# Patient Record
Sex: FEMALE | Race: BLACK OR AFRICAN AMERICAN | NOT HISPANIC OR LATINO | Employment: STUDENT | URBAN - METROPOLITAN AREA
[De-identification: names, ages, dates, MRNs, and addresses within clinical notes are randomized per-mention and may not be internally consistent; named-entity substitution may affect disease eponyms.]

---

## 2017-05-17 ENCOUNTER — HOSPITAL ENCOUNTER (EMERGENCY)
Facility: HOSPITAL | Age: 27
Discharge: HOME | End: 2017-05-17
Attending: EMERGENCY MEDICINE | Admitting: EMERGENCY MEDICINE

## 2017-05-17 VITALS
SYSTOLIC BLOOD PRESSURE: 118 MMHG | DIASTOLIC BLOOD PRESSURE: 63 MMHG | TEMPERATURE: 99.2 F | OXYGEN SATURATION: 97 % | RESPIRATION RATE: 18 BRPM | HEART RATE: 72 BPM | BODY MASS INDEX: 31.86 KG/M2 | HEIGHT: 67 IN | WEIGHT: 203 LBS

## 2017-05-17 DIAGNOSIS — S91.311A LACERATION OF RIGHT HEEL, INITIAL ENCOUNTER: Primary | ICD-10-CM

## 2017-05-17 PROCEDURE — 25010000002 TDAP 5-2.5-18.5 LF-MCG/0.5 SUSPENSION: Performed by: EMERGENCY MEDICINE

## 2017-05-17 PROCEDURE — 90715 TDAP VACCINE 7 YRS/> IM: CPT | Performed by: EMERGENCY MEDICINE

## 2017-05-17 PROCEDURE — 99283 EMERGENCY DEPT VISIT LOW MDM: CPT

## 2017-05-17 PROCEDURE — 90471 IMMUNIZATION ADMIN: CPT | Performed by: EMERGENCY MEDICINE

## 2017-05-17 RX ORDER — BACITRACIN, NEOMYCIN, POLYMYXIN B 400; 3.5; 5 [USP'U]/G; MG/G; [USP'U]/G
OINTMENT TOPICAL
Status: DISCONTINUED
Start: 2017-05-17 | End: 2017-05-17 | Stop reason: WASHOUT

## 2017-05-17 RX ORDER — BACITRACIN ZINC AND POLYMYXIN B SULFATE 500; 10000 [USP'U]/G; [USP'U]/G
OINTMENT TOPICAL ONCE
Status: COMPLETED | OUTPATIENT
Start: 2017-05-17 | End: 2017-05-17

## 2017-05-17 RX ADMIN — BACITRACIN ZINC AND POLYMYXIN B SULFATE 1 APPLICATION: 500; 10000 OINTMENT TOPICAL at 19:04

## 2017-05-17 RX ADMIN — TETANUS TOXOID, REDUCED DIPHTHERIA TOXOID AND ACELLULAR PERTUSSIS VACCINE, ADSORBED 0.5 ML: 5; 2.5; 8; 8; 2.5 SUSPENSION INTRAMUSCULAR at 19:15

## 2017-06-09 ENCOUNTER — OFFICE VISIT (OUTPATIENT)
Dept: INTERNAL MEDICINE | Facility: CLINIC | Age: 27
End: 2017-06-09

## 2017-06-09 VITALS
HEIGHT: 67 IN | SYSTOLIC BLOOD PRESSURE: 110 MMHG | BODY MASS INDEX: 32.99 KG/M2 | TEMPERATURE: 97.8 F | WEIGHT: 210.2 LBS | DIASTOLIC BLOOD PRESSURE: 66 MMHG

## 2017-06-09 DIAGNOSIS — S91.301D OPEN WOUND OF RIGHT HEEL, SUBSEQUENT ENCOUNTER: Primary | ICD-10-CM

## 2017-06-09 PROCEDURE — 99203 OFFICE O/P NEW LOW 30 MIN: CPT | Performed by: NURSE PRACTITIONER

## 2017-06-09 RX ORDER — ETONOGESTREL AND ETHINYL ESTRADIOL 11.7; 2.7 MG/1; MG/1
1 INSERT, EXTENDED RELEASE VAGINAL
COMMUNITY
End: 2018-10-08

## 2017-06-09 NOTE — PROGRESS NOTES
Subjective   Vahid Overton is a 27 y.o. female    Chief Complaint   Patient presents with   • Establish Care   • ER follow up on Laceration from 5/17/17   • Laceration     Right heel.      History of Present Illness     New pt here to establish care.      On the evening of 5/17/17, Vahid and a friend were cooking out.  Her friend dropped the metal spatula he was using and it landed on her right heel.  She initially bandaged it and thought it was fine.  It was still bleeding the next AM, so she went to the  and they sent her to the ER in case there was damage to her achilles.  She was evaluated and Tdap was updated.  Her achilles did not appear to be involved and she was d/c'd home being as though it was too late for sutures.  It seemed to be healing well, but she flexed her foot about 1 week later and the wound popped back open.  She has been keeping it wrapped and wearing a splint off and on since then.  Wound seems to be healing well and she has full ROM of this heel/foot.  No pain, redness or swelling.  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      Past Medical History:   Diagnosis Date   • History of Papanicolaou smear of cervix 02/2017    Student clinic at [facility]     History reviewed. No pertinent surgical history.    Allergies   Allergen Reactions   • Sulfa Antibiotics Rash     Family History   Problem Relation Age of Onset   • Breast cancer Mother 36 1994   • Diabetes Father    • Hypertension Father    • Breast cancer Maternal Aunt    • Diabetes Paternal Uncle    • Breast cancer Maternal Grandmother    • Diabetes Paternal Grandmother    • Kidney disease Paternal Grandmother    • No Known Problems Paternal Grandfather    • Diabetes Paternal Uncle    • Diabetes Paternal Uncle    • No Known Problems Sister    • No Known Problems Brother    • No Known Problems Sister    • No Known Problems Brother    • No Known Problems Brother    • No Known Problems Brother       Social History     Social History   • Marital status: Single     Spouse name: N/A   • Number of children: N/A   • Years of education: N/A     Occupational History   • Not on file.     Social History Main Topics   • Smoking status: Never Smoker   • Smokeless tobacco: Never Used   • Alcohol use Yes      Comment: Socially on occasion   • Drug use: Yes     Special: Marijuana   • Sexual activity: Yes     Partners: Male     Birth control/ protection: Implant     Other Topics Concern   • Not on file     Social History Narrative   • No narrative on file         The following portions of the patient's history were reviewed and updated as appropriate: allergies, current medications, past family history, past medical history, past social history, past surgical history and problem list.    Current Outpatient Prescriptions:   •  Etonogestrel (NEXPLANON) 68 MG implant subdermal implant, Inject 1 each into the skin 1 (One) Time., Disp: , Rfl:   •  etonogestrel-ethinyl estradiol (NUVARING) 0.12-0.015 MG/24HR vaginal ring, Insert 1 each into the vagina Every 28 (Twenty-Eight) Days. Insert vaginally and leave in place for 3 consecutive weeks, then remove for 1 week., Disp: , Rfl:      Review of Systems   Constitutional: Negative for chills, fatigue and fever.   Respiratory: Negative for cough, chest tightness and shortness of breath.    Cardiovascular: Negative for chest pain.   Gastrointestinal: Negative for abdominal pain, diarrhea, nausea and vomiting.   Endocrine: Negative for cold intolerance and heat intolerance.   Musculoskeletal: Negative for arthralgias.   Skin: Positive for wound (right heel).   Neurological: Negative for dizziness.       Objective   Physical Exam   Constitutional: She is oriented to person, place, and time. She appears well-developed and well-nourished.   HENT:   Head: Normocephalic and atraumatic.   Eyes: Conjunctivae and EOM are normal. Pupils are equal, round, and reactive to light.   Neck: Normal  "range of motion.   Cardiovascular: Normal rate, regular rhythm and normal heart sounds.    Pulmonary/Chest: Effort normal and breath sounds normal.   Abdominal: Soft. Bowel sounds are normal.   Musculoskeletal: Normal range of motion.        Right ankle: Achilles tendon normal. Achilles tendon exhibits no pain, no defect and normal Pat's test results.   Neurological: She is alert and oriented to person, place, and time. She has normal reflexes.   Skin: Skin is warm and dry. Laceration (right heel; approximatly 2cm; scabbed and healing well without s/s of infection) noted.   Psychiatric: She has a normal mood and affect. Her behavior is normal. Judgment and thought content normal.     Vitals:    06/09/17 1039   BP: 110/66   Temp: 97.8 °F (36.6 °C)   TempSrc: Temporal Artery    Weight: 210 lb 3.2 oz (95.3 kg)   Height: 67\" (170.2 cm)         Assessment/Plan   Vahid was seen today for establish care, er follow up on laceration from 5/17/17 and laceration.    Diagnoses and all orders for this visit:    Open wound of right heel, subsequent encounter    Heel looks great!  Achilles exam normal  May return to work without restriction  RTC soon for PE         "

## 2017-06-19 ENCOUNTER — TELEPHONE (OUTPATIENT)
Dept: INTERNAL MEDICINE | Facility: CLINIC | Age: 27
End: 2017-06-19

## 2017-06-19 NOTE — TELEPHONE ENCOUNTER
Patient called to talk to you about the foot injury she saw Renetta for last week. She is concerned about her achilles tendon. Thanks!

## 2017-06-19 NOTE — TELEPHONE ENCOUNTER
Renetta Sent you an order to have an MRI done, but patient would like to have this done somewhere at [facility] in [place].

## 2017-06-19 NOTE — TELEPHONE ENCOUNTER
Spoke with Vahid and she said that it is still very hard to stand on her tip toes or use that foot to push off. She said that it swollen around that area, but the wound has healed. I let her know I spoke with you and you were okay with a referral to ortho or setting her up for an MRI.     She would like to go ahead and proceed with the MRI asap. She wants to make sure there is no tear of the Achilles before [place] leaves out of the [place].

## 2017-06-20 NOTE — TELEPHONE ENCOUNTER
I sent Mary a msg to make sure she can get the MRI scheduled in [place] in possible, But i think you still have to put the order in for the MRI

## 2017-06-22 DIAGNOSIS — M76.61 TENDONITIS, ACHILLES, RIGHT: Primary | ICD-10-CM

## 2017-08-01 ENCOUNTER — OFFICE VISIT (OUTPATIENT)
Dept: INTERNAL MEDICINE | Facility: CLINIC | Age: 27
End: 2017-08-01

## 2017-08-01 VITALS
WEIGHT: 207 LBS | HEART RATE: 67 BPM | OXYGEN SATURATION: 99 % | TEMPERATURE: 98.1 F | SYSTOLIC BLOOD PRESSURE: 116 MMHG | HEIGHT: 67 IN | BODY MASS INDEX: 32.49 KG/M2 | DIASTOLIC BLOOD PRESSURE: 60 MMHG

## 2017-08-01 DIAGNOSIS — Z23 NEED FOR HEPATITIS A IMMUNIZATION: ICD-10-CM

## 2017-08-01 DIAGNOSIS — Z00.00 ANNUAL PHYSICAL EXAM: Primary | ICD-10-CM

## 2017-08-01 LAB
25(OH)D3 SERPL-MCNC: 16 NG/ML
ALBUMIN SERPL-MCNC: 4.1 G/DL (ref 3.2–4.8)
ALBUMIN/GLOB SERPL: 1.2 G/DL (ref 1.5–2.5)
ALP SERPL-CCNC: 45 U/L (ref 25–100)
ALT SERPL W P-5'-P-CCNC: 14 U/L (ref 7–40)
ANION GAP SERPL CALCULATED.3IONS-SCNC: 2 MMOL/L (ref 3–11)
AST SERPL-CCNC: 21 U/L (ref 0–33)
BASOPHILS # BLD AUTO: 0.01 10*3/MM3 (ref 0–0.2)
BASOPHILS NFR BLD AUTO: 0.1 % (ref 0–1)
BILIRUB BLD-MCNC: NEGATIVE MG/DL
BILIRUB SERPL-MCNC: 0.3 MG/DL (ref 0.3–1.2)
BUN BLD-MCNC: 15 MG/DL (ref 9–23)
BUN/CREAT SERPL: 16.7 (ref 7–25)
CALCIUM SPEC-SCNC: 9.4 MG/DL (ref 8.7–10.4)
CHLORIDE SERPL-SCNC: 104 MMOL/L (ref 99–109)
CHOLEST SERPL-MCNC: 125 MG/DL (ref 0–200)
CLARITY, POC: CLEAR
CO2 SERPL-SCNC: 30 MMOL/L (ref 20–31)
COLOR UR: YELLOW
CREAT BLD-MCNC: 0.9 MG/DL (ref 0.6–1.3)
DEPRECATED RDW RBC AUTO: 43.5 FL (ref 37–54)
EOSINOPHIL # BLD AUTO: 0.11 10*3/MM3 (ref 0–0.3)
EOSINOPHIL NFR BLD AUTO: 1 % (ref 0–3)
ERYTHROCYTE [DISTWIDTH] IN BLOOD BY AUTOMATED COUNT: 12.3 % (ref 11.3–14.5)
GFR SERPL CREATININE-BSD FRML MDRD: 92 ML/MIN/1.73
GLOBULIN UR ELPH-MCNC: 3.3 GM/DL
GLUCOSE BLD-MCNC: 92 MG/DL (ref 70–100)
GLUCOSE UR STRIP-MCNC: NEGATIVE MG/DL
HCT VFR BLD AUTO: 36.9 % (ref 34.5–44)
HGB BLD-MCNC: 12.3 G/DL (ref 11.5–15.5)
IMM GRANULOCYTES # BLD: 0.02 10*3/MM3 (ref 0–0.03)
IMM GRANULOCYTES NFR BLD: 0.2 % (ref 0–0.6)
KETONES UR QL: NEGATIVE
LEUKOCYTE EST, POC: NEGATIVE
LYMPHOCYTES # BLD AUTO: 2.64 10*3/MM3 (ref 0.6–4.8)
LYMPHOCYTES NFR BLD AUTO: 23.2 % (ref 24–44)
MCH RBC QN AUTO: 32.6 PG (ref 27–31)
MCHC RBC AUTO-ENTMCNC: 33.3 G/DL (ref 32–36)
MCV RBC AUTO: 97.9 FL (ref 80–99)
MONOCYTES # BLD AUTO: 0.69 10*3/MM3 (ref 0–1)
MONOCYTES NFR BLD AUTO: 6.1 % (ref 0–12)
NEUTROPHILS # BLD AUTO: 7.93 10*3/MM3 (ref 1.5–8.3)
NEUTROPHILS NFR BLD AUTO: 69.4 % (ref 41–71)
NITRITE UR-MCNC: NEGATIVE MG/ML
PH UR: 7 [PH] (ref 5–8)
PLATELET # BLD AUTO: 328 10*3/MM3 (ref 150–450)
PMV BLD AUTO: 9.6 FL (ref 6–12)
POTASSIUM BLD-SCNC: 3.9 MMOL/L (ref 3.5–5.5)
PROT SERPL-MCNC: 7.4 G/DL (ref 5.7–8.2)
PROT UR STRIP-MCNC: NEGATIVE MG/DL
RBC # BLD AUTO: 3.77 10*6/MM3 (ref 3.89–5.14)
RBC # UR STRIP: NEGATIVE /UL
SODIUM BLD-SCNC: 136 MMOL/L (ref 132–146)
SP GR UR: 1.01 (ref 1–1.03)
TSH SERPL DL<=0.05 MIU/L-ACNC: 1.71 MIU/ML (ref 0.35–5.35)
UROBILINOGEN UR QL: NORMAL
VIT B12 BLD-MCNC: 391 PG/ML (ref 211–911)
WBC NRBC COR # BLD: 11.4 10*3/MM3 (ref 3.5–10.8)

## 2017-08-01 PROCEDURE — 81003 URINALYSIS AUTO W/O SCOPE: CPT | Performed by: NURSE PRACTITIONER

## 2017-08-01 PROCEDURE — 90471 IMMUNIZATION ADMIN: CPT | Performed by: NURSE PRACTITIONER

## 2017-08-01 PROCEDURE — 82465 ASSAY BLD/SERUM CHOLESTEROL: CPT | Performed by: NURSE PRACTITIONER

## 2017-08-01 PROCEDURE — 90632 HEPA VACCINE ADULT IM: CPT | Performed by: NURSE PRACTITIONER

## 2017-08-01 PROCEDURE — 85025 COMPLETE CBC W/AUTO DIFF WBC: CPT | Performed by: NURSE PRACTITIONER

## 2017-08-01 PROCEDURE — 82607 VITAMIN B-12: CPT | Performed by: NURSE PRACTITIONER

## 2017-08-01 PROCEDURE — 99395 PREV VISIT EST AGE 18-39: CPT | Performed by: NURSE PRACTITIONER

## 2017-08-01 PROCEDURE — 82306 VITAMIN D 25 HYDROXY: CPT | Performed by: NURSE PRACTITIONER

## 2017-08-01 PROCEDURE — 84443 ASSAY THYROID STIM HORMONE: CPT | Performed by: NURSE PRACTITIONER

## 2017-08-01 PROCEDURE — 80053 COMPREHEN METABOLIC PANEL: CPT | Performed by: NURSE PRACTITIONER

## 2017-08-01 NOTE — PROGRESS NOTES
Subjective   Vahid Overton is a 27 y.o. female    Chief Complaint   Patient presents with   • Annual Exam     Physical     History of Present Illness     Here for PE    Pt states that she fell and twisted her right ankle about one week ago.  This is the same ankle that she injured in May 2017, when she dropped a spatula on the achilles and had a laceration.  Went to the ER, no sutures were placed.  She has continued to have weakness and pain in the left achilles since.  I have ordered an MRI, but it has not been done.      Will be traveling to [facility] on 8/4/17 for a 6 day vacation.  Has had the Hep B series, but not the Hep A.      Tdap - 5/17/17  Flu shot - fall 2016  Pap - per GYN at     Diet - eats very healthy    Exercise - moderate; works out most days per week.     Social History   Substance Use Topics   • Smoking status: Never Smoker   • Smokeless tobacco: Never Used   • Alcohol use Yes      Comment: Socially on occasion         The following portions of the patient's history were reviewed and updated as appropriate: allergies, current medications, past family history, past medical history, past social history, past surgical history and problem list.    Current Outpatient Prescriptions:   •  Etonogestrel (NEXPLANON) 68 MG implant subdermal implant, Inject 1 each into the skin 1 (One) Time., Disp: , Rfl:   •  etonogestrel-ethinyl estradiol (NUVARING) 0.12-0.015 MG/24HR vaginal ring, Insert 1 each into the vagina Every 28 (Twenty-Eight) Days. Insert vaginally and leave in place for 3 consecutive weeks, then remove for 1 week., Disp: , Rfl:      Review of Systems   Constitutional: Negative for appetite change, chills, fatigue, fever and unexpected weight change.   HENT: Negative for congestion, ear pain, nosebleeds, rhinorrhea and tinnitus.    Eyes: Negative for pain.   Respiratory: Negative for cough, chest tightness and shortness of breath.    Cardiovascular: Negative for chest pain, palpitations and leg  swelling.   Gastrointestinal: Negative for abdominal distention, abdominal pain, blood in stool, constipation, diarrhea, nausea and vomiting.   Endocrine: Negative for cold intolerance, heat intolerance, polydipsia, polyphagia and polyuria.   Genitourinary: Negative for dysuria, flank pain, frequency, hematuria and urgency.   Musculoskeletal: Negative for arthralgias, back pain, gait problem, joint swelling, myalgias and neck pain.        Right ankle pain   Skin: Negative for color change, pallor, rash and wound.   Allergic/Immunologic: Negative for environmental allergies and food allergies.   Neurological: Negative for dizziness, syncope, weakness, light-headedness, numbness and headaches.   Hematological: Negative for adenopathy. Does not bruise/bleed easily.   Psychiatric/Behavioral: Negative for behavioral problems and suicidal ideas. The patient is not nervous/anxious.        Objective   Physical Exam   Constitutional: She is oriented to person, place, and time. She appears well-developed and well-nourished.   HENT:   Head: Normocephalic and atraumatic.   Right Ear: External ear normal.   Left Ear: External ear normal.   Nose: Nose normal.   Mouth/Throat: Oropharynx is clear and moist.   Eyes: Conjunctivae and EOM are normal. Pupils are equal, round, and reactive to light.   Neck: Normal range of motion. Neck supple.   Cardiovascular: Normal rate, regular rhythm, normal heart sounds and intact distal pulses.    Pulses:       Carotid pulses are 2+ on the right side, and 2+ on the left side.  Pulmonary/Chest: Effort normal and breath sounds normal. Right breast exhibits no inverted nipple, no mass, no nipple discharge, no skin change and no tenderness. Left breast exhibits no inverted nipple, no mass, no nipple discharge, no skin change and no tenderness. Breasts are symmetrical. There is no breast swelling.   Abdominal: Soft. Normal appearance, normal aorta and bowel sounds are normal.   Genitourinary: No  "breast tenderness, discharge or bleeding.   Musculoskeletal: Normal range of motion.        Right ankle: She exhibits normal range of motion, no swelling, no ecchymosis, no deformity, no laceration and normal pulse. No tenderness. No lateral malleolus, no medial malleolus, no AITFL, no CF ligament, no posterior TFL, no head of 5th metatarsal and no proximal fibula tenderness found. Achilles tendon exhibits pain and defect. Achilles tendon exhibits normal Pat's test results.   Lymphadenopathy:        Head (right side): No tonsillar adenopathy present.        Head (left side): No tonsillar adenopathy present.        Right cervical: No superficial cervical and no posterior cervical adenopathy present.       Left cervical: No superficial cervical and no posterior cervical adenopathy present.   Neurological: She is alert and oriented to person, place, and time. She has normal strength and normal reflexes. She displays a negative Romberg sign.   Skin: Skin is warm, dry and intact.   Psychiatric: She has a normal mood and affect. Her behavior is normal. Judgment and thought content normal.   Vitals reviewed.    Vitals:    08/01/17 1318   BP: 116/60   BP Location: Right arm   Pulse: 67   Temp: 98.1 °F (36.7 °C)   TempSrc: Temporal Artery    SpO2: 99%   Weight: 207 lb (93.9 kg)   Height: 66.75\" (169.5 cm)         Assessment/Plan   Vahid was seen today for annual exam.    Diagnoses and all orders for this visit:    Annual physical exam  -     POCT urinalysis dipstick, automated  -     CBC & Differential  -     Cholesterol, Total  -     Comprehensive Metabolic Panel  -     Vitamin D 25 Hydroxy  -     Vitamin B12  -     TSH  -     Hepatitis A Vaccine Adult IM  -     CBC Auto Differential    Need for hepatitis A immunization  -     Hepatitis A Vaccine Adult IM    UA is clear  Labs sent   Hep A updated, she will RTC for 2nd dose in 6 months  Pap UTD per GYN  All other immunizations are UTD  Counseling - diet and " exercise  MRI was approved and scheduled, but the scheduling office was not able to get in touch with her.  It will be PA'd again  Repeat PE in 1 year or sooner with any problems           Answers for HPI/ROS submitted by the patient on 7/30/2017   Incident occurred: more than 1 week ago  Incident location: at the pool  Injury mechanism: a direct blow  Pain location: right ankle  Pain quality: stabbing  Pain - numeric: 3/10  Pain course: improving  muscle weakness: Yes  Foreign body present: no foreign bodies

## 2017-08-08 RX ORDER — CHOLECALCIFEROL (VITAMIN D3) 1250 MCG
50000 CAPSULE ORAL
Qty: 8 CAPSULE | Refills: 0 | Status: SHIPPED | OUTPATIENT
Start: 2017-08-08 | End: 2017-09-27

## 2017-08-09 ENCOUNTER — TELEPHONE (OUTPATIENT)
Dept: INTERNAL MEDICINE | Facility: CLINIC | Age: 27
End: 2017-08-09

## 2017-08-09 NOTE — TELEPHONE ENCOUNTER
----- Message from BARTOLOME Jiang sent at 8/8/2017  3:25 PM EDT -----  Please let pt know that her Vitamin D is very low.  I have sent in an 8 week Rx of D3 that she will take once a week, then she will need to start on OTC D3, 4000 units daily.  B12 is also low.  I recommend OTC B12, 1000 mg daily.      All other labs looked good.

## 2017-09-16 ENCOUNTER — HOSPITAL ENCOUNTER (OUTPATIENT)
Dept: MRI IMAGING | Facility: HOSPITAL | Age: 27
Discharge: HOME | End: 2017-09-16
Admitting: NURSE PRACTITIONER

## 2017-09-16 DIAGNOSIS — M76.61 TENDONITIS, ACHILLES, RIGHT: ICD-10-CM

## 2017-09-16 PROCEDURE — 73721 MRI JNT OF LWR EXTRE W/O DYE: CPT

## 2017-09-21 ENCOUNTER — TELEPHONE (OUTPATIENT)
Dept: INTERNAL MEDICINE | Facility: CLINIC | Age: 27
End: 2017-09-21

## 2017-09-21 NOTE — TELEPHONE ENCOUNTER
----- Message from BARTOLOME Jiang sent at 9/21/2017  3:38 PM EDT -----  MRI of ankle shows a small, incomplete tear of the achilles tendon along with tendonitis.  I would like to refer her to Ortho.

## 2017-09-29 ENCOUNTER — TELEPHONE (OUTPATIENT)
Dept: INTERNAL MEDICINE | Facility: CLINIC | Age: 27
End: 2017-09-29

## 2017-09-29 DIAGNOSIS — M76.61 TENDONITIS, ACHILLES, RIGHT: ICD-10-CM

## 2017-09-29 DIAGNOSIS — S86.011S PARTIAL TEAR OF RIGHT ACHILLES TENDON, SEQUELA: Primary | ICD-10-CM

## 2017-09-29 NOTE — TELEPHONE ENCOUNTER
MsStiven Olguinviki called needing her referral for ortho saysshe is in pain, she is getting a lot of cramps, please call patient regarding ortho

## 2017-10-09 ENCOUNTER — OFFICE VISIT (OUTPATIENT)
Dept: ORTHOPEDIC SURGERY | Facility: CLINIC | Age: 27
End: 2017-10-09

## 2017-10-09 VITALS
BODY MASS INDEX: 33.27 KG/M2 | WEIGHT: 212 LBS | SYSTOLIC BLOOD PRESSURE: 140 MMHG | HEIGHT: 67 IN | HEART RATE: 64 BPM | DIASTOLIC BLOOD PRESSURE: 70 MMHG

## 2017-10-09 DIAGNOSIS — S86.029S: Primary | ICD-10-CM

## 2017-10-09 PROCEDURE — 99204 OFFICE O/P NEW MOD 45 MIN: CPT | Performed by: ORTHOPAEDIC SURGERY

## 2017-10-09 NOTE — PROGRESS NOTES
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  In mid May of this year someone dropped a sharp spatula that hit the back of her right ankle transversely.  She was seen in the [facility] emergency room, she was not thought to have an Achilles tendon injury, the wound ultimately healed.  She reports she was placed in a boot for a while, and then allowed out of the boot.  She has had weakness and some pain in the ankle.  She has not been able to run.  She has difficulty going up on tiptoes.  She has been unable to wear heels.  She has been trying to increase her exercise for weight loss, and has been unable to do this.  She has an MRI that shows what I think is a partial laceration of the Achilles tendon with some retraction of a portion of the tendon, with some central cavitation filled with fluid.  No signs of infection.  She is here for an opinion as to what can be done now.  She reports the pain level is 6 out of 10, worse with increased activity better with rest.  She has not had any other treatment.  She does have bilateral bunions and moderately flatfeet, the bunions do not bother her.  She is planning on going to PA school after her master's degree.    Current Outpatient Prescriptions on File Prior to Visit   Medication Sig Dispense Refill   • Etonogestrel (NEXPLANON) 68 MG implant subdermal implant Inject 1 each into the skin 1 (One) Time.     • etonogestrel-ethinyl estradiol (NUVARING) 0.12-0.015 MG/24HR vaginal ring Insert 1 each into the vagina Every 28 (Twenty-Eight) Days. Insert vaginally and leave in place for 3 consecutive weeks, then remove for 1 week.       No current facility-administered medications  on file prior to visit.       Allergies   Allergen Reactions   • Sulfa Antibiotics Rash      Past Medical History:   Diagnosis Date   • History of Papanicolaou smear of cervix 02/2017    Student clinic at . [facility]     History reviewed. No pertinent surgical history.  Family History   Problem Relation Age of Onset   • Breast cancer Mother 36     1994   • Diabetes Father    • Hypertension Father    • Breast cancer Maternal Aunt    • Diabetes Paternal Uncle    • Breast cancer Maternal Grandmother    • Diabetes Paternal Grandmother    • Kidney disease Paternal Grandmother    • No Known Problems Paternal Grandfather    • Diabetes Paternal Uncle    • Diabetes Paternal Uncle    • No Known Problems Sister    • No Known Problems Brother    • No Known Problems Sister    • No Known Problems Brother    • No Known Problems Brother    • No Known Problems Brother       Social History     Social History   • Marital status: Single     Spouse name: N/A   • Number of children: N/A   • Years of education: N/A     Occupational History   • Not on file.     Social History Main Topics   • Smoking status: Never Smoker   • Smokeless tobacco: Never Used   • Alcohol use Yes      Comment: Socially on occasion   • Drug use: Yes     Special: Marijuana   • Sexual activity: Yes     Partners: Male     Birth control/ protection: Implant     Other Topics Concern   • Not on file     Social History Narrative        Review of Systems   Constitutional: Negative.    HENT: Negative.    Eyes: Negative.    Respiratory: Negative.    Cardiovascular: Negative.    Gastrointestinal: Negative.    Endocrine: Negative.    Genitourinary: Positive for vaginal bleeding.   Musculoskeletal: Positive for arthralgias.   Skin: Negative.    Allergic/Immunologic: Negative.    Neurological: Negative.    Hematological: Negative.    Psychiatric/Behavioral: Negative.        The following portions of the patient's history were reviewed and updated as appropriate:  "allergies, current medications, past family history, past medical history, past social history, past surgical history and problem list.    Physical Exam:   /70  Pulse 64  Ht 66.54\" (169 cm)  Wt 212 lb (96.2 kg)  BMI 33.67 kg/m2  GENERAL: Body habitus: overweight    Lower extremity edema: Left: none; Right: none    Varicose veins:  Left: none; Right: none    Gait: normal     Mental Status:  awake and alert; oriented to person, place, and time    Voice:  clear  SKIN:  Normal and warm and dry    Hair Growth:  Right:normal; Left:  normal  NAILS: Toenails: normal  HEENT: Head: Normocephalic, atraumatic,  without obvious abnormality.  eye: normal external eye, no icterus  ears: normal external ears  nose: normal external nose  pharynx: dental hygiene adequate  PULM:  Repiratory effort normal  CV:  Dorsalis Pedis:  Right: 2+; Left:2+    Posterior Tibial: Right:2+; Left:2+    Capillary Refill:  Brisk  MSK:  Hand:right handed      Tibia:  Right:  non tender; Left:  non tender      Ankle:  Right: She has a flexible flat foot, Pat's test shows that at least some of the Achilles is intact, the resting posture of the right foot is in less plantarflexion compared with the left in the prone position.  She has a little bit of hyper dorsiflexion on the right compared with the left.  She has a healed transverse laceration over the Achilles about 3-4 cm above the insertion.  She is tender to palpation there.  The Achilles is thickened above this.  She has 5 out of 5 strength to resisted testing of the Achilles.  Other motors are 5 out of 5.; Left:  non tender, ROM  normal and motor function  normal      Foot:  Right:  See above; Left:  non tender and Flexible flatfoot, Achilles is normal on the left      NEURO: Heel Walking:  Right:  normal; Left:  normal    Toe Walking:  Right:  limited ability, painful and She is able to do a double leg toe raise and she can walk on her toes with a little bit of weakness on the right, " she has difficulty doing a single leg toe raise on the right; Left:  normal     [place] 5.07 monofilament test: normal    Lower extremity sensation: intact     Reflexes:  Biceps:  Right:  1+; Left:  1+           Quads:  Right:  1+; Left:  1+           Ankle:  Right:  0; Left:  0      Calf Atrophy:Mild right calf atrophy    Motor Function: all 5/5 to gross motor testing         Medical Decision Making    Data Review:   reviewed radiology images    and reviewed radiology results       Assessment and Plan/ Diagnosis/Treatment options:   1. Laceration of Achilles tendon, sequela  She has a partial laceration of the Achilles on the left with some retraction of part of the tendon as well as some central cavitation with fluid in it.  I explained this makes for difficult reconstruction.  I certainly done a fair number of late Achilles tendon completely ruptures, and a few partial ruptures in much older patients.  It's hard to know exactly what to do with a 27-year-old with a partial rupture.  My concern is that in resecting the damaged tendon, there will be a big enough gap that we will have to sacrifice the FHL.  Older patients might not notice a loss of push off strength sacrificing the FHL, but a younger patient may be more aware of this.  It also is a big surgery with a long recovery time.  I have done this procedure on somebody as young as 35, but 25 minutes even younger.  I think she has 2 options, one is to try some physical therapy to see if she can recruit some strength the other is to consider surgery.  She asked if it would matter if she delayed the surgery until after PT and I don't think it makes a great deal of difference since its already 5 months delayed.  It's now a chronic partial rupture, rather than acute.  I explained that I've seen a similar partial rupture or, laceration, in a child, that I repaired at the time of the injury that did well.  As noted above I have not seen a delayed partial  rupture or laceration like this in someone in her age group.  We also discussed obtaining another opinion, she would like to do this, and I would have her see Dr. Marciano morrow [facility].  We will have her get a copy of the MRI disc from radiology at the [facility].  She would like to try physical therapy and I'll see her again when that's completed.

## 2017-10-12 ENCOUNTER — HOSPITAL ENCOUNTER (OUTPATIENT)
Dept: PHYSICAL THERAPY | Facility: HOSPITAL | Age: 27
Setting detail: THERAPIES SERIES
Discharge: HOME | End: 2017-10-12

## 2017-10-12 DIAGNOSIS — S86.011A ACHILLES TENDON TEAR, RIGHT, INITIAL ENCOUNTER: Primary | ICD-10-CM

## 2017-10-12 PROCEDURE — 97161 PT EVAL LOW COMPLEX 20 MIN: CPT

## 2017-10-12 NOTE — THERAPY EVALUATION
Outpatient Physical Therapy Ortho Initial Evaluation  [facility]     Patient Name: Vahid Overton  : 1990  MRN: [id number]  Today's Date: 10/12/2017      Visit Date: 10/12/2017    Patient Active Problem List   Diagnosis   • Laceration of Achilles tendon, sequela        Past Medical History:   Diagnosis Date   • History of Papanicolaou smear of cervix 2017    Student clinic at [facility]          Visit Dx:     ICD-10-CM ICD-9-CM   1. Achilles tendon tear, right, initial encounter S86.011A 845.09             Patient History       10/12/17 1400          History    Chief Complaint Difficulty with daily activities;Difficulty Walking;Fatigue/poor endurance;Joint stiffness;Muscle tenderness;Muscle weakness;Pain  -MM      Type of Pain Ankle pain   Right  -MM      Date Current Problem(s) Began 17  -MM      Brief Description of Current Complaint Client reports injuring her right Achilles tendon May 2017.  Someone dropped a spatula and it cut her Achilles tendon.  She sought treatment and wore a splint on her leg for 7-10 days.  She continued to have problems and an MRI was consistent with partial tear of the Achilles tendon.  She followed up with orthopedic surgeon and surgical options as well as physical therapy were discussed.  She hopes to improve strength and function to avoid surgery.  -MM      Patient/Caregiver Goals Improve strength;Improve mobility;Relieve pain  -MM      Occupation/sports/leisure activities  at [employer]  -MM      What clinical tests have you had for this problem? MRI  -MM      Results of Clinical Tests Partial tear of the Achilles  -MM      Are you or can you be pregnant No  -MM      Pain     Pain Location Ankle  -MM      Pain at Present 0  -MM      Pain at Best 0  -MM      Pain at Worst 6  -MM      Pain Frequency Intermittent  -MM      Pain Description Burning;Aching  -MM      Pain Comments Pain is worse with prolonged standing and  walking.  Pain improves with rest.  Client is unable to run or wear heels.  -MM      Difficulties with ADL's? Walking, standing for long periods, and stairs.  -MM      Daily Activities    Primary Language English  -MM      Are you able to read Yes  -MM      Are you able to write Yes  -MM      Pt Participated in POC and Goals Yes  -MM        User Key  (r) = Recorded By, (t) = Taken By, (c) = Cosigned By    Initials Name Provider Type    DARIA Calle, PT Physical Therapist                PT Ortho       10/12/17 1400    Posture/Observations    Posture/Observations Comments Increased soft tissue texture noted right Achilles tendon.  Right Achilles tendon also appears much thicker than the left.  pes planus bilateral.  Palpation: Moderate tenderness and increased soft tissue texture distal right Achilles tendon  -MM    ROM (Range of Motion)    General ROM Detail Ankle mobility is good  -MM    Right Ankle    Dorsiflexion AROM Deficit 28°  -MM    Plantarflexion AROM Deficit 52°  -MM    Inversion AROM Deficit 35°  -MM    Eversion AROM Deficit 22°  -MM    Right Ankle/Foot    Ankle PF Gross Movement (3/5) fair   Unable to push off for heel raise  -MM    Ankle Dorsiflexion Gross Movement (5/5) normal  -MM    Subtalar Inversion Gross Movement (5/5) normal  -MM    Subtalar Eversion Gross Movement (5/5) normal  -MM    Balance Skills Training    SLS R: 20 sec  -MM      User Key  (r) = Recorded By, (t) = Taken By, (c) = Cosigned By    Initials Name Provider Type    DARIA Calle PT Physical Therapist                            Therapy Education       10/12/17 1400          Therapy Education    Education Details Provided and reviewed home program to begin strengthening and stretching.  -MM        User Key  (r) = Recorded By, (t) = Taken By, (c) = Cosigned By    Initials Name Provider Type    DARIA Calle, PT Physical Therapist                PT OP Goals       10/12/17 1400       PT Short Term Goals    STG Date  to Achieve 11/02/17  -MM     STG 1 Client will report 75% improvement in ankle pain with daily activity.  -MM     STG 1 Progress New  -MM     STG 2 Right ankle Achilles tenderness will resolve.  -MM     STG 2 Progress New  -MM     STG 3 Client be independent with home program to minimize pain and improve overall strength and function.  -MM     STG 3 Progress New  -MM     Long Term Goals    LTG Date to Achieve 11/09/17  -MM     LTG 1 LEF S score will improve to 85% or better.  -MM     LTG 1 Progress New  -MM     LTG 2 Client will demonstrate 10 single leg heel raises with the right leg.  -MM     LTG 2 Progress New  -MM     LTG 3 Client will return to walking without difficulty.  -MM     LTG 3 Progress New  -MM     LTG 4 Client will return to walking up and down stairs without difficulty.  -MM     LTG 4 Progress New  -MM     Time Calculation    PT Goal Re-Cert Due Date 01/10/18  -MM       User Key  (r) = Recorded By, (t) = Taken By, (c) = Cosigned By    Initials Name Provider Type    MM Lul Calle, PT Physical Therapist                PT Assessment/Plan       10/12/17 1400       PT Assessment    Functional Limitations Limitation in home management;Limitations in community activities;Performance in leisure activities;Performance in self-care ADL  -MM     Impairments Gait;Muscle strength;Pain;Range of motion  -MM     Assessment Comments Client presents with evolving symptoms of low complexity.  Signs and symptoms are consistent with right Achilles pain with activity after partial Achilles tear.  Care will need to be taken to help with flexibility and improve overall strength without over stressing the tendon.  Client should also benefit from ASTYM to minimize inflammation and scar tissue restrictions.  -MM     Please refer to paper survey for additional self-reported information No  -MM     Rehab Potential Good  -MM     Patient/caregiver participated in establishment of treatment plan and goals Yes  -MM      Patient would benefit from skilled therapy intervention Yes  -MM     PT Plan    PT Frequency 2x/week  -MM     Predicted Duration of Therapy Intervention (days/wks) 16 visits  -MM     Planned CPT's? PT EVAL LOW COMPLEXITY: 72391;PT THER PROC EA 15 MIN: 01486;PT MANUAL THERAPY EA 15 MIN: 06363;PT NEUROMUSC RE-EDUCATION EA 15 MIN: 13004;PT ULTRASOUND EA 15 MIN: 18765;PT HOT/COLD PACK WC NONMCARE: 43460  -MM     PT Plan Comments Continue per plan of treatment with exercises and ASTYM.  Gradually progress ankle strengthening.  -MM       User Key  (r) = Recorded By, (t) = Taken By, (c) = Cosigned By    Initials Name Provider Type    MM Lul Calle, PT Physical Therapist                                    Outcome Measures       10/12/17 1400          30 Second Chair Stand Test    30 Second Chair Stand Test --  -MM      Lower Extremity Functional Index    Any of your usual work, housework or school activities 3  -MM      Your usual hobbies, recreational or sporting activities 0  -MM      Getting into or out of the bath 4  -MM      Walking between rooms 4  -MM      Putting on your shoes or socks 4  -MM      Squatting 4  -MM      Lifting an object, like a bag of groceries from the floor 4  -MM      Performing light activities around your home 4  -MM      Performing heavy activities around your home 4  -MM      Getting into or out of a car 4  -MM      Walking 2 blocks 4  -MM      Walking a mile 2  -MM      Going up or down 10 stairs (about 1 flight of stairs) 2  -MM      Standing for 1 hour 2  -MM      Sitting for 1 hour 4  -MM      Running on even ground 0  -MM      Running on uneven ground 0  -MM      Making sharp turns while running fast 1  -MM      Hopping 1  -MM      Rolling over in bed 4  -MM      Total 55  -MM      Functional Assessment    Outcome Measure Options Lower Extremity Functional Scale (LEFS)   69%  -MM        User Key  (r) = Recorded By, (t) = Taken By, (c) = Cosigned By    Initials Name Provider Type     MM Lul Calle, PT Physical Therapist            Time Calculation:   Start Time: 1400     Therapy Charges for Today     Code Description Service Date Service Provider Modifiers Qty    14177035986 HC PT EVAL LOW COMPLEXITY 4 10/12/2017 Lul Calle, PT GP 1          PT G-Codes  Outcome Measure Options: Lower Extremity Functional Scale (LEFS) (69%)         Lul Calle, PT  10/12/2017

## 2017-10-25 ENCOUNTER — HOSPITAL ENCOUNTER (OUTPATIENT)
Dept: PHYSICAL THERAPY | Facility: HOSPITAL | Age: 27
Setting detail: THERAPIES SERIES
Discharge: HOME | End: 2017-10-25

## 2017-10-25 DIAGNOSIS — S86.011A ACHILLES TENDON TEAR, RIGHT, INITIAL ENCOUNTER: Primary | ICD-10-CM

## 2017-10-25 PROCEDURE — 97140 MANUAL THERAPY 1/> REGIONS: CPT

## 2017-10-25 PROCEDURE — 97110 THERAPEUTIC EXERCISES: CPT

## 2017-10-30 ENCOUNTER — HOSPITAL ENCOUNTER (OUTPATIENT)
Dept: PHYSICAL THERAPY | Facility: HOSPITAL | Age: 27
Setting detail: THERAPIES SERIES
Discharge: HOME | End: 2017-10-30

## 2017-10-30 DIAGNOSIS — S86.011A ACHILLES TENDON TEAR, RIGHT, INITIAL ENCOUNTER: Primary | ICD-10-CM

## 2017-10-30 PROCEDURE — 97140 MANUAL THERAPY 1/> REGIONS: CPT

## 2017-10-30 PROCEDURE — 97110 THERAPEUTIC EXERCISES: CPT

## 2017-10-30 NOTE — THERAPY TREATMENT NOTE
Outpatient Physical Therapy Ortho Treatment Note  [facility]     Patient Name: Vahid Overton  : 1990  MRN: [id number]  Today's Date: 10/30/2017      Visit Date: 10/30/2017    Visit Dx:    ICD-10-CM ICD-9-CM   1. Achilles tendon tear, right, initial encounter S86.011A 845.09       Patient Active Problem List   Diagnosis   • Laceration of Achilles tendon, sequela        Past Medical History:   Diagnosis Date   • History of Papanicolaou smear of cervix 2017    Student clinic at [facility]             PT Assessment/Plan       10/30/17 1000       PT Assessment    Assessment Comments No pain with exercises, but quite a bit of fatigue. Some tenderness with ASTYM.  -MM     PT Plan    PT Plan Comments Continue per plan of treatment with  manual therapy and exercises.   -MM       User Key  (r) = Recorded By, (t) = Taken By, (c) = Cosigned By    Initials Name Provider Type    DARIA Calle, PT Physical Therapist                    Exercises       10/30/17 1000          Subjective Comments    Subjective Comments Client reports mild pain today.  -MM      Subjective Pain    Able to rate subjective pain? yes  -MM      Pre-Treatment Pain Level 3  -MM      Post-Treatment Pain Level 2  -MM      Exercise 1    Exercise Name 1 Included execises in clinical setting per flow sheet.   -MM      Cueing 1 Verbal  -MM      Time (Minutes) 1 10  -MM      Additional Comments ther ex  -MM        User Key  (r) = Recorded By, (t) = Taken By, (c) = Cosigned By    Initials Name Provider Type    DARIA Calle, PT Physical Therapist                        Manual Rx (last 36 hours)      Manual Treatments       10/30/17 1000          Manual Rx 1    Manual Rx 1 Location Right Achilles and lower leg  -MM      Manual Rx 1 Type Provided soft tissue mobilization using ASTYM technique  -MM      Manual Rx 1 Duration 20  -MM        User Key  (r) = Recorded By, (t) = Taken By, (c) = Cosigned By    Initials Name Provider Type     MM Lul Calle, PT Physical Therapist                        Time Calculation:   Start Time: 1000  Total Timed Code Minutes- PT: 30 minute(s)    Therapy Charges for Today     Code Description Service Date Service Provider Modifiers Qty    20208396046  PT MANUAL THERAPY EA 15 MIN 10/30/2017 Lul Calle, PT GP 1    [id number]  PT THER PROC EA 15 MIN 10/30/2017 Lul Calle, PT GP 1                    Lul Calle, PT  10/30/2017

## 2017-11-01 ENCOUNTER — HOSPITAL ENCOUNTER (OUTPATIENT)
Dept: PHYSICAL THERAPY | Facility: HOSPITAL | Age: 27
Setting detail: THERAPIES SERIES
Discharge: HOME | End: 2017-11-01

## 2017-11-01 DIAGNOSIS — S86.011A ACHILLES TENDON TEAR, RIGHT, INITIAL ENCOUNTER: Primary | ICD-10-CM

## 2017-11-01 PROCEDURE — 97140 MANUAL THERAPY 1/> REGIONS: CPT

## 2017-11-01 PROCEDURE — 97110 THERAPEUTIC EXERCISES: CPT

## 2017-11-01 NOTE — THERAPY TREATMENT NOTE
Outpatient Physical Therapy Ortho Treatment Note  [facility]     Patient Name: Vahid Overton  : 1990  MRN: [id number]  Today's Date: 2017      Visit Date: 2017    Visit Dx:    ICD-10-CM ICD-9-CM   1. Achilles tendon tear, right, initial encounter S86.011A 845.09       Patient Active Problem List   Diagnosis   • Laceration of Achilles tendon, sequela        Past Medical History:   Diagnosis Date   • History of Papanicolaou smear of cervix 2017    Student clinic at [facility] [place]                      PT Assessment/Plan       17 1045       PT Assessment    Assessment Comments Exercises were tolerated well. Progressed to standing eccentric lowering for calf strength. She had a little soreness, but overall felt okay. Tenderness continues with ASTYM, but feels good afterward.  -MM     PT Plan    PT Plan Comments Continue per plan of treatment with ASTYM and exercises.   -MM       User Key  (r) = Recorded By, (t) = Taken By, (c) = Cosigned By    Initials Name Provider Type    DARIA Calle, PT Physical Therapist                    Exercises       17 1045          Subjective Comments    Subjective Comments Client reports mild pain continues. She continues with some tenderness and quite a bit of weakness.  -MM      Subjective Pain    Able to rate subjective pain? yes  -MM      Pre-Treatment Pain Level 1  -MM      Post-Treatment Pain Level 1  -MM      Exercise 1    Exercise Name 1 Continued exercises in clinical setting per flow sheet. Added dorsiflexion with band and standing eccentric calf lowering.  -MM      Cueing 1 Verbal  -MM      Time (Minutes) 1 15  -MM      Additional Comments ther ex  -MM        User Key  (r) = Recorded By, (t) = Taken By, (c) = Cosigned By    Initials Name Provider Type    DARIA Calle, PT Physical Therapist                        Manual Rx (last 36 hours)      Manual Treatments       17 1045          Manual Rx 1    Manual Rx 1  Location Right Achilles and lower leg  -MM      Manual Rx 1 Type Provided soft tissue mobilization using ASTYM technique  -MM      Manual Rx 1 Duration 25  -MM        User Key  (r) = Recorded By, (t) = Taken By, (c) = Cosigned By    Initials Name Provider Type    DARIA Calle, PT Physical Therapist                        Time Calculation:   Start Time: 1045  Total Timed Code Minutes- PT: 40 minute(s)    Therapy Charges for Today     Code Description Service Date Service Provider Modifiers Qty    54965131887  PT MANUAL THERAPY EA 15 MIN 11/1/2017 Lul Calle, PT GP 2    [id number]  PT THER PROC EA 15 MIN 11/1/2017 Lul Calle, PT GP 1                    Lul Calle, PT  11/1/2017

## 2017-11-06 ENCOUNTER — HOSPITAL ENCOUNTER (OUTPATIENT)
Dept: PHYSICAL THERAPY | Facility: HOSPITAL | Age: 27
Setting detail: THERAPIES SERIES
Discharge: HOME | End: 2017-11-06

## 2017-11-06 DIAGNOSIS — S86.011A ACHILLES TENDON TEAR, RIGHT, INITIAL ENCOUNTER: Primary | ICD-10-CM

## 2017-11-06 PROCEDURE — 97110 THERAPEUTIC EXERCISES: CPT

## 2017-11-06 PROCEDURE — 97140 MANUAL THERAPY 1/> REGIONS: CPT

## 2017-11-07 NOTE — THERAPY TREATMENT NOTE
Outpatient Physical Therapy Ortho Treatment Note  [facility]     Patient Name: Vahid Overton  : 1990  MRN: [id number]  Today's Date: 2017      Visit Date: 2017    Visit Dx:    ICD-10-CM ICD-9-CM   1. Achilles tendon tear, right, initial encounter S86.011A 845.09       Patient Active Problem List   Diagnosis   • Laceration of Achilles tendon, sequela        Past Medical History:   Diagnosis Date   • History of Papanicolaou smear of cervix 2017    Student clinic at [facility]                 PT Assessment/Plan       17 1600       PT Assessment    Assessment Comments Client continues with some tenderness and weakness. She has been able to perform strengthening exercises, but they are quite challenging.  -MM     PT Plan    PT Plan Comments Continue per plan of treatment with exercises and manual therapy.  -MM       User Key  (r) = Recorded By, (t) = Taken By, (c) = Cosigned By    Initials Name Provider Type    DARIA Calle, PT Physical Therapist                    Exercises       17 1600          Subjective Comments    Subjective Comments Client reports that she continues to notice some discomfort if walking long periods. She also continues with weakness.  -MM      Subjective Pain    Able to rate subjective pain? yes  -MM      Pre-Treatment Pain Level 1  -MM      Post-Treatment Pain Level 1  -MM      Exercise 1    Exercise Name 1 Continued exercises in clinical setting.   -MM      Cueing 1 Verbal  -MM      Time (Minutes) 1 15  -MM      Additional Comments ther ex  -MM        User Key  (r) = Recorded By, (t) = Taken By, (c) = Cosigned By    Initials Name Provider Type    DARIA Calle, PT Physical Therapist              Manual Rx (last 36 hours)      Manual Treatments       17 1600          Manual Rx 1    Manual Rx 1 Location Right Achilles and lower leg  -MM      Manual Rx 1 Type Provided soft tissue mobilization using ASTYM technique  -MM      Manual  Rx 1 Duration 30  -MM        User Key  (r) = Recorded By, (t) = Taken By, (c) = Cosigned By    Initials Name Provider Type    MM Lul Calle, PT Physical Therapist      Time Calculation:   Start Time: 1600  Total Timed Code Minutes- PT: 45 minute(s)    Therapy Charges for Today     Code Description Service Date Service Provider Modifiers Qty    35883797515  PT THER PROC EA 15 MIN 11/6/2017 Lul Calle, PT GP 1    [id number]  PT MANUAL THERAPY EA 15 MIN 11/6/2017 Lul Calle, PT GP 2                    Lul Calle, PT  11/7/2017

## 2017-11-20 ENCOUNTER — HOSPITAL ENCOUNTER (OUTPATIENT)
Dept: PHYSICAL THERAPY | Facility: HOSPITAL | Age: 27
Setting detail: THERAPIES SERIES
Discharge: HOME | End: 2017-11-20

## 2017-11-20 DIAGNOSIS — S86.011A ACHILLES TENDON TEAR, RIGHT, INITIAL ENCOUNTER: Primary | ICD-10-CM

## 2017-11-20 PROCEDURE — 97140 MANUAL THERAPY 1/> REGIONS: CPT

## 2017-11-21 NOTE — THERAPY DISCHARGE NOTE
Outpatient Physical Therapy Ortho Treatment Note/Discharge Summary   [facility]     Patient Name: Vahid Overton  : 1990  MRN: [id number]  Today's Date: 2017      Visit Date: 2017    Visit Dx:    ICD-10-CM ICD-9-CM   1. Achilles tendon tear, right, initial encounter S86.011A 845.09       Patient Active Problem List   Diagnosis   • Laceration of Achilles tendon, sequela        Past Medical History:   Diagnosis Date   • History of Papanicolaou smear of cervix 2017    Student clinic at [facility]        No past surgical history on file.          PT Ortho       17 1615    Right Ankle    Plantarflexion AROM Deficit 55 degrees  -MM    Lower Extremity    Lower Ext Manual Muscle Testing Detail Unable to demonstate SL heel raise. Some improvement controlling SL lowering from heel raise.  -MM    Right Ankle/Foot    Ankle PF Gross Movement (4-/5) good minus   able to hold against manual resistance.  -MM    Ankle Dorsiflexion Gross Movement (5/5) normal  -MM    Subtalar Inversion Gross Movement (5/5) normal  -MM    Subtalar Eversion Gross Movement (5/5) normal  -MM    Balance Skills Training    SLS WNL  -MM      User Key  (r) = Recorded By, (t) = Taken By, (c) = Cosigned By    Initials Name Provider Type    MM Lul Calle, PT Physical Therapist                            PT Assessment/Plan       17 1613       PT Assessment    Assessment Comments Overall client made a signifcant amount of progress since starting treatment.  LEF S score improved from 69% to 84% ability.  She has been able to walk in heels and negotiate stairs without difficulty. She has not been experiencing any pain with daily activity. She has mild tenderness at achilles tendon. Increased soft tissue texture is still noted at right achilles tendon. She continues with plantarflexion weakness and understands that it may take several weeks for plantarflexion strength to improve. Overall balance is very good.  Client understanding of exercises to continue is very good.   -MM     PT Plan    PT Plan Comments Discharge due to patient is independent.   -MM       User Key  (r) = Recorded By, (t) = Taken By, (c) = Cosigned By    Initials Name Provider Type    DARIA Calle, PT Physical Therapist                    Exercises       11/20/17 1615          Subjective Comments    Subjective Comments Client reports no pain at the time of treatment today. She still notices some weakness, but has been able to walk in heels without pain.  -MM      Subjective Pain    Able to rate subjective pain? yes  -MM      Pre-Treatment Pain Level 0  -MM      Post-Treatment Pain Level 0  -MM      Exercise 1    Exercise Name 1 Reviewed exercises.  -MM      Time (Minutes) 1 5  -MM      Additional Comments ther ex  -MM        User Key  (r) = Recorded By, (t) = Taken By, (c) = Cosigned By    Initials Name Provider Type    DARIA Calle, PT Physical Therapist             Manual Rx (last 36 hours)      Manual Treatments       11/20/17 1615          Manual Rx 1    Manual Rx 1 Location Right Achilles and lower leg  -MM      Manual Rx 1 Type Provided soft tissue mobilization using ASTYM technique  -MM      Manual Rx 1 Duration 30  -MM        User Key  (r) = Recorded By, (t) = Taken By, (c) = Cosigned By    Initials Name Provider Type    DARIA Calle, PT Physical Therapist                PT OP Goals       11/20/17 1615       PT Short Term Goals    STG Date to Achieve 11/02/17  -MM     STG 1 Client will report 75% improvement in ankle pain with daily activity.  -MM     STG 1 Progress Met  -MM     STG 2 Right ankle Achilles tenderness will resolve.  -MM     STG 2 Progress Not Met  -MM     STG 2 Progress Comments mild tenderness  -MM     STG 3 Client be independent with home program to minimize pain and improve overall strength and function.  -MM     STG 3 Progress Met  -MM     Long Term Goals    LTG Date to Achieve 11/09/17  -MM     LTG 1 LEF  S score will improve to 85% or better.  -MM     LTG 1 Progress Not Met  -MM     LTG 1 Progress Comments improved to 84% ability  -MM     LTG 2 Client will demonstrate 10 single leg heel raises with the right leg.  -MM     LTG 2 Progress Not Met  -MM     LTG 2 Progress Comments Unable to perform SL  heel raise  -MM     LTG 3 Client will return to walking without difficulty.  -MM     LTG 3 Progress Met  -MM     LTG 4 Client will return to walking up and down stairs without difficulty.  -MM     LTG 4 Progress Met  -MM     Time Calculation    PT Goal Re-Cert Due Date 01/10/18  -MM       User Key  (r) = Recorded By, (t) = Taken By, (c) = Cosigned By    Initials Name Provider Type    DARIA Calle, DOREEN Physical Therapist                Therapy Education       11/20/17 1615          Therapy Education    Education Details Reviewed home program.  Home exercises include double leg heel raises, double leg heel raise and single leg lowering, resisted plantarflexion with band resistance, and calf stretch.  -MM        User Key  (r) = Recorded By, (t) = Taken By, (c) = Cosigned By    Initials Name Provider Type    DARIA Calle, PT Physical Therapist                Outcome Measures       11/20/17 1615          Lower Extremity Functional Index    Any of your usual work, housework or school activities 4  -MM      Your usual hobbies, recreational or sporting activities 2  -MM      Getting into or out of the bath 4  -MM      Walking between rooms 4  -MM      Putting on your shoes or socks 4  -MM      Squatting 4  -MM      Lifting an object, like a bag of groceries from the floor 4  -MM      Performing light activities around your home 4  -MM      Performing heavy activities around your home 4  -MM      Getting into or out of a car 4  -MM      Walking 2 blocks 4  -MM      Walking a mile 3  -MM      Going up or down 10 stairs (about 1 flight of stairs) 4  -MM      Standing for 1 hour 3  -MM      Sitting for 1 hour 4  -MM       Running on even ground 1  -MM      Running on uneven ground 0  -MM      Making sharp turns while running fast 3  -MM      Hopping 3  -MM      Rolling over in bed 4  -MM      Total 67  -MM      Functional Assessment    Outcome Measure Options Lower Extremity Functional Scale (LEFS)   84%  -MM        User Key  (r) = Recorded By, (t) = Taken By, (c) = Cosigned By    Initials Name Provider Type    MM Lul Calle, PT Physical Therapist            Time Calculation:   Start Time: 1615  Total Timed Code Minutes- PT: 30 minute(s)    Therapy Charges for Today     Code Description Service Date Service Provider Modifiers Qty    85940818254 HC PT MANUAL THERAPY EA 15 MIN 11/20/2017 Lul Calle, PT GP 2          PT G-Codes  Outcome Measure Options: Lower Extremity Functional Scale (LEFS) (84%)     OP PT Discharge Summary  Date of Discharge: 11/20/17  Reason for Discharge: Independent  Outcomes Achieved: Refer to plan of care for updates on goals achieved  Discharge Destination: Home with home program      Lul Calle, PT  11/21/2017

## 2017-12-18 ENCOUNTER — OFFICE VISIT (OUTPATIENT)
Dept: ORTHOPEDIC SURGERY | Facility: CLINIC | Age: 27
End: 2017-12-18

## 2017-12-18 DIAGNOSIS — S86.029S: Primary | ICD-10-CM

## 2017-12-18 PROCEDURE — 99212 OFFICE O/P EST SF 10 MIN: CPT | Performed by: ORTHOPAEDIC SURGERY

## 2017-12-18 NOTE — PROGRESS NOTES
Answers for HPI/ROS submitted by the patient on 2017   Lower extremity pain  Incident occurred: more than 1 week ago  Incident location: at the pool  Injury mechanism: a direct blow  Pain location: right ankle  Pain - numeric: 0/10  Pain course: improving  tingling: No  inability to bear weight: No  loss of motion: No  loss of sensation: No  muscle weakness: Yes  Foreign body present: metal  Aggravated by: nothing  ESTABLISHED PATIENT    Patient: Vahid Overton  : 1990    Primary Care Provider: BARTOLOME Norris    Requesting Provider: As above    Follow-up (2 months left ankle)      History    Chief Complaint: She is much improved after partial laceration of the right Achilles    History of Present Illness: She has done physical therapy and is much better.  She notes that the strength is not entirely normal, but the pain has resolved, and she feels much closer to normal she was even able to wear a pair of high heeled shoes for a while.    Current Outpatient Prescriptions on File Prior to Visit   Medication Sig Dispense Refill   • Etonogestrel (NEXPLANON) 68 MG implant subdermal implant Inject 1 each into the skin 1 (One) Time.     • etonogestrel-ethinyl estradiol (NUVARING) 0.12-0.015 MG/24HR vaginal ring Insert 1 each into the vagina Every 28 (Twenty-Eight) Days. Insert vaginally and leave in place for 3 consecutive weeks, then remove for 1 week.       No current facility-administered medications on file prior to visit.       Allergies   Allergen Reactions   • Sulfa Antibiotics Rash      Past Medical History:   Diagnosis Date   • History of Papanicolaou smear of cervix 2017    Student clinic at [facility]     History reviewed. No pertinent surgical history.  Family History   Problem Relation Age of Onset   • Breast cancer Mother 36        • Diabetes Father    • Hypertension Father    • Breast cancer Maternal Aunt    • Diabetes Paternal Uncle    • Breast cancer Maternal  Grandmother    • Diabetes Paternal Grandmother    • Kidney disease Paternal Grandmother    • No Known Problems Paternal Grandfather    • Diabetes Paternal Uncle    • Diabetes Paternal Uncle    • No Known Problems Sister    • No Known Problems Brother    • No Known Problems Sister    • No Known Problems Brother    • No Known Problems Brother    • No Known Problems Brother       Social History     Social History   • Marital status: Single     Spouse name: N/A   • Number of children: N/A   • Years of education: N/A     Occupational History   • Not on file.     Social History Main Topics   • Smoking status: Never Smoker   • Smokeless tobacco: Never Used   • Alcohol use Yes      Comment: Socially on occasion   • Drug use: Yes     Special: Marijuana   • Sexual activity: Yes     Partners: Male     Birth control/ protection: Implant     Other Topics Concern   • Not on file     Social History Narrative        Review of Systems    The following portions of the patient's history were reviewed and updated as appropriate: allergies, current medications, past family history, past medical history, past social history, past surgical history and problem list.    Physical Exam:   There were no vitals taken for this visit.  GENERAL: Body habitus: overweight    Lower extremity edema: Left: none; Right: none    Gait: normal     Mental Status:  awake and alert; oriented to person, place, and time  MSK:  Tibia:  Right:  non tender; Left:  non tender        Ankle:  Right: No longer tender over the Achilles laceration, it's fully healed, she is able to toe walk, she has excellent strength to resisted testing, and no pain; Left:  non tender            Data Review:   none    Assessment/Plan/Diagnosis/Treatment Options:   1. Laceration of Achilles tendon, sequela    She is much improved.  She is not entirely normal but is much better than she was.  I do not think I have any surgery that can make her better than this.  I would recommend she  continue to do the strengthening on her own.  I'll be happy to see her any time

## 2018-10-08 ENCOUNTER — OFFICE VISIT (OUTPATIENT)
Dept: INTERNAL MEDICINE | Facility: CLINIC | Age: 28
End: 2018-10-08

## 2018-10-08 VITALS
RESPIRATION RATE: 16 BRPM | HEART RATE: 65 BPM | TEMPERATURE: 98.5 F | DIASTOLIC BLOOD PRESSURE: 66 MMHG | OXYGEN SATURATION: 100 % | WEIGHT: 216.4 LBS | SYSTOLIC BLOOD PRESSURE: 110 MMHG | BODY MASS INDEX: 33.97 KG/M2 | HEIGHT: 67 IN

## 2018-10-08 DIAGNOSIS — L73.2 HIDRADENITIS: Primary | ICD-10-CM

## 2018-10-08 DIAGNOSIS — L03.112 CELLULITIS OF LEFT AXILLA: ICD-10-CM

## 2018-10-08 PROCEDURE — 99214 OFFICE O/P EST MOD 30 MIN: CPT | Performed by: NURSE PRACTITIONER

## 2018-10-08 PROCEDURE — 87205 SMEAR GRAM STAIN: CPT | Performed by: NURSE PRACTITIONER

## 2018-10-08 PROCEDURE — 87070 CULTURE OTHR SPECIMN AEROBIC: CPT | Performed by: NURSE PRACTITIONER

## 2018-10-08 RX ORDER — DOXYCYCLINE HYCLATE 100 MG/1
100 CAPSULE ORAL 2 TIMES DAILY
Qty: 20 CAPSULE | Refills: 0 | Status: SHIPPED | OUTPATIENT
Start: 2018-10-08 | End: 2018-10-18

## 2018-10-08 NOTE — PROGRESS NOTES
Subjective   Vahid Overton is a 28 y.o. female    Chief Complaint   Patient presents with   • Possible boil under left underarm     recurrent issues over the last year.      History of Present Illness     Pt states that she has had recurrent boils in her axilla and groin regions off and on for > 1 year.  Areas begin as firm boils.  Some burst open and drain.  Has had the same one recur in her left axilla several times.  Has not been on antibiotics.  Did have some antibiotic ointment that she used for a while, which did help some.  No fever or chills or other associated sx's.      The following portions of the patient's history were reviewed and updated as appropriate: allergies, current medications, past family history, past medical history, past social history, past surgical history and problem list.    Current Outpatient Prescriptions:   •  doxycycline (VIBRAMYCIN) 100 MG capsule, Take 1 capsule by mouth 2 (Two) Times a Day for 10 days., Disp: 20 capsule, Rfl: 0     Review of Systems   Constitutional: Negative for chills, fatigue and fever.   Respiratory: Negative for cough, chest tightness and shortness of breath.    Cardiovascular: Negative for chest pain.   Gastrointestinal: Negative for abdominal pain, diarrhea, nausea and vomiting.   Endocrine: Negative for cold intolerance and heat intolerance.   Musculoskeletal: Negative for arthralgias.   Skin: Positive for wound.        boils   Neurological: Negative for dizziness.       Objective   Physical Exam   Constitutional: She is oriented to person, place, and time. She appears well-developed and well-nourished.   HENT:   Head: Normocephalic and atraumatic.   Eyes: Pupils are equal, round, and reactive to light. Conjunctivae and EOM are normal.   Neck: Normal range of motion.   Cardiovascular: Normal rate, regular rhythm and normal heart sounds.    Pulmonary/Chest: Effort normal and breath sounds normal.   Abdominal: Soft. Bowel sounds are normal.  "  Musculoskeletal: Normal range of motion.   Neurological: She is alert and oriented to person, place, and time. She has normal reflexes.   Skin: Skin is warm and dry.   Left axilla with open, draining, firm boil; tender to palpation   Psychiatric: She has a normal mood and affect. Her behavior is normal. Judgment and thought content normal.     Vitals:    10/08/18 1448   BP: 110/66   Pulse: 65   Resp: 16   Temp: 98.5 °F (36.9 °C)   TempSrc: Temporal Artery    SpO2: 100%   Weight: 98.2 kg (216 lb 6.4 oz)   Height: 169.5 cm (66.73\")         Assessment/Plan   Vahid was seen today for possible boil under left underarm.    Diagnoses and all orders for this visit:    Hidradenitis  -     Ambulatory Referral to Dermatology  -     Wound Culture - Wound, Axilla, Left    Cellulitis of left axilla  -     Ambulatory Referral to Dermatology  -     Wound Culture - Wound, Axilla, Left    Other orders  -     doxycycline (VIBRAMYCIN) 100 MG capsule; Take 1 capsule by mouth 2 (Two) Times a Day for 10 days.    D/C sent for culture  Referred to derm  Covered with Doxy  Return for Next scheduled follow up.               "

## 2018-10-10 LAB
BACTERIA SPEC AEROBE CULT: NORMAL
GRAM STN SPEC: NORMAL
GRAM STN SPEC: NORMAL

## 2018-10-17 ENCOUNTER — TELEPHONE (OUTPATIENT)
Dept: INTERNAL MEDICINE | Facility: CLINIC | Age: 28
End: 2018-10-17

## 2018-10-17 NOTE — TELEPHONE ENCOUNTER
Vahid has been notified    Sx's are a a little better. She thinks it is going down some.    She has not been set up for Derm appt yet. She spoke with Meche and meche told her that Derm should be calling her to set up appt.

## 2018-10-17 NOTE — TELEPHONE ENCOUNTER
----- Message from BARTOLOME Jiang sent at 10/16/2018  1:10 PM EDT -----  Wound culture shows normal skin adolfo.  How are her sx's?  Does she have a derm appt yet?

## 2018-11-12 ENCOUNTER — OFFICE VISIT (OUTPATIENT)
Dept: INTERNAL MEDICINE | Facility: CLINIC | Age: 28
End: 2018-11-12

## 2018-11-12 VITALS
WEIGHT: 216 LBS | HEIGHT: 67 IN | RESPIRATION RATE: 16 BRPM | TEMPERATURE: 97.4 F | SYSTOLIC BLOOD PRESSURE: 110 MMHG | BODY MASS INDEX: 33.9 KG/M2 | OXYGEN SATURATION: 99 % | HEART RATE: 76 BPM | DIASTOLIC BLOOD PRESSURE: 60 MMHG

## 2018-11-12 DIAGNOSIS — Z01.419 ENCOUNTER FOR GYNECOLOGICAL EXAMINATION WITHOUT ABNORMAL FINDING: ICD-10-CM

## 2018-11-12 DIAGNOSIS — Z00.00 ANNUAL PHYSICAL EXAM: Primary | ICD-10-CM

## 2018-11-12 DIAGNOSIS — Z23 FLU VACCINE NEED: ICD-10-CM

## 2018-11-12 DIAGNOSIS — Z23 NEED FOR HEPATITIS A VACCINATION: ICD-10-CM

## 2018-11-12 DIAGNOSIS — Z11.3 SCREEN FOR STD (SEXUALLY TRANSMITTED DISEASE): ICD-10-CM

## 2018-11-12 LAB
25(OH)D3 SERPL-MCNC: 15.6 NG/ML
ALBUMIN SERPL-MCNC: 4.24 G/DL (ref 3.2–4.8)
ALBUMIN/GLOB SERPL: 1.7 G/DL (ref 1.5–2.5)
ALP SERPL-CCNC: 50 U/L (ref 25–100)
ALT SERPL W P-5'-P-CCNC: 17 U/L (ref 7–40)
ANION GAP SERPL CALCULATED.3IONS-SCNC: 4 MMOL/L (ref 3–11)
AST SERPL-CCNC: 21 U/L (ref 0–33)
BASOPHILS # BLD AUTO: 0.01 10*3/MM3 (ref 0–0.2)
BASOPHILS NFR BLD AUTO: 0.1 % (ref 0–1)
BILIRUB BLD-MCNC: NEGATIVE MG/DL
BILIRUB SERPL-MCNC: 0.4 MG/DL (ref 0.3–1.2)
BUN BLD-MCNC: 13 MG/DL (ref 9–23)
BUN/CREAT SERPL: 11.2 (ref 7–25)
CALCIUM SPEC-SCNC: 9 MG/DL (ref 8.7–10.4)
CHLORIDE SERPL-SCNC: 107 MMOL/L (ref 99–109)
CHOLEST SERPL-MCNC: 134 MG/DL (ref 0–200)
CLARITY, POC: CLEAR
CO2 SERPL-SCNC: 27 MMOL/L (ref 20–31)
COLOR UR: YELLOW
CREAT BLD-MCNC: 1.16 MG/DL (ref 0.6–1.3)
DEPRECATED RDW RBC AUTO: 47 FL (ref 37–54)
EOSINOPHIL # BLD AUTO: 0.1 10*3/MM3 (ref 0–0.3)
EOSINOPHIL NFR BLD AUTO: 0.9 % (ref 0–3)
ERYTHROCYTE [DISTWIDTH] IN BLOOD BY AUTOMATED COUNT: 12.8 % (ref 11.3–14.5)
GFR SERPL CREATININE-BSD FRML MDRD: 68 ML/MIN/1.73
GLOBULIN UR ELPH-MCNC: 2.6 GM/DL
GLUCOSE BLD-MCNC: 77 MG/DL (ref 70–100)
GLUCOSE UR STRIP-MCNC: NEGATIVE MG/DL
HAV IGM SERPL QL IA: NORMAL
HBV CORE IGM SERPL QL IA: NORMAL
HBV SURFACE AG SERPL QL IA: NORMAL
HCT VFR BLD AUTO: 40 % (ref 34.5–44)
HCV AB SER DONR QL: NORMAL
HGB BLD-MCNC: 13 G/DL (ref 11.5–15.5)
HIV1+2 AB SER QL: NORMAL
IMM GRANULOCYTES # BLD: 0.02 10*3/MM3 (ref 0–0.03)
IMM GRANULOCYTES NFR BLD: 0.2 % (ref 0–0.6)
KETONES UR QL: NEGATIVE
LEUKOCYTE EST, POC: NEGATIVE
LYMPHOCYTES # BLD AUTO: 2.35 10*3/MM3 (ref 0.6–4.8)
LYMPHOCYTES NFR BLD AUTO: 22 % (ref 24–44)
MCH RBC QN AUTO: 32.5 PG (ref 27–31)
MCHC RBC AUTO-ENTMCNC: 32.5 G/DL (ref 32–36)
MCV RBC AUTO: 100 FL (ref 80–99)
MONOCYTES # BLD AUTO: 0.67 10*3/MM3 (ref 0–1)
MONOCYTES NFR BLD AUTO: 6.3 % (ref 0–12)
NEUTROPHILS # BLD AUTO: 7.54 10*3/MM3 (ref 1.5–8.3)
NEUTROPHILS NFR BLD AUTO: 70.5 % (ref 41–71)
NITRITE UR-MCNC: NEGATIVE MG/ML
PH UR: 6 [PH] (ref 5–8)
PLATELET # BLD AUTO: 318 10*3/MM3 (ref 150–450)
PMV BLD AUTO: 10 FL (ref 6–12)
POTASSIUM BLD-SCNC: 4.2 MMOL/L (ref 3.5–5.5)
PROT SERPL-MCNC: 6.8 G/DL (ref 5.7–8.2)
PROT UR STRIP-MCNC: NEGATIVE MG/DL
RBC # BLD AUTO: 4 10*6/MM3 (ref 3.89–5.14)
RBC # UR STRIP: NEGATIVE /UL
SODIUM BLD-SCNC: 138 MMOL/L (ref 132–146)
SP GR UR: 1.03 (ref 1–1.03)
TSH SERPL DL<=0.05 MIU/L-ACNC: 1.97 MIU/ML (ref 0.35–5.35)
UROBILINOGEN UR QL: NORMAL
VIT B12 BLD-MCNC: 423 PG/ML (ref 211–911)
WBC NRBC COR # BLD: 10.69 10*3/MM3 (ref 3.5–10.8)

## 2018-11-12 PROCEDURE — 90674 CCIIV4 VAC NO PRSV 0.5 ML IM: CPT | Performed by: NURSE PRACTITIONER

## 2018-11-12 PROCEDURE — 99395 PREV VISIT EST AGE 18-39: CPT | Performed by: NURSE PRACTITIONER

## 2018-11-12 PROCEDURE — 86694 HERPES SIMPLEX NES ANTBDY: CPT | Performed by: NURSE PRACTITIONER

## 2018-11-12 PROCEDURE — 82465 ASSAY BLD/SERUM CHOLESTEROL: CPT | Performed by: NURSE PRACTITIONER

## 2018-11-12 PROCEDURE — 87591 N.GONORRHOEAE DNA AMP PROB: CPT | Performed by: NURSE PRACTITIONER

## 2018-11-12 PROCEDURE — 80074 ACUTE HEPATITIS PANEL: CPT | Performed by: NURSE PRACTITIONER

## 2018-11-12 PROCEDURE — 86696 HERPES SIMPLEX TYPE 2 TEST: CPT | Performed by: NURSE PRACTITIONER

## 2018-11-12 PROCEDURE — 86592 SYPHILIS TEST NON-TREP QUAL: CPT | Performed by: NURSE PRACTITIONER

## 2018-11-12 PROCEDURE — 82306 VITAMIN D 25 HYDROXY: CPT | Performed by: NURSE PRACTITIONER

## 2018-11-12 PROCEDURE — 82607 VITAMIN B-12: CPT | Performed by: NURSE PRACTITIONER

## 2018-11-12 PROCEDURE — G0432 EIA HIV-1/HIV-2 SCREEN: HCPCS | Performed by: NURSE PRACTITIONER

## 2018-11-12 PROCEDURE — 86695 HERPES SIMPLEX TYPE 1 TEST: CPT | Performed by: NURSE PRACTITIONER

## 2018-11-12 PROCEDURE — 87491 CHLMYD TRACH DNA AMP PROBE: CPT | Performed by: NURSE PRACTITIONER

## 2018-11-12 PROCEDURE — 85025 COMPLETE CBC W/AUTO DIFF WBC: CPT | Performed by: NURSE PRACTITIONER

## 2018-11-12 PROCEDURE — 80053 COMPREHEN METABOLIC PANEL: CPT | Performed by: NURSE PRACTITIONER

## 2018-11-12 PROCEDURE — 81003 URINALYSIS AUTO W/O SCOPE: CPT | Performed by: NURSE PRACTITIONER

## 2018-11-12 PROCEDURE — 90471 IMMUNIZATION ADMIN: CPT | Performed by: NURSE PRACTITIONER

## 2018-11-12 PROCEDURE — 84443 ASSAY THYROID STIM HORMONE: CPT | Performed by: NURSE PRACTITIONER

## 2018-11-12 RX ORDER — DOXYCYCLINE 100 MG/1
1 CAPSULE ORAL 2 TIMES DAILY
COMMUNITY
Start: 2018-10-25 | End: 2020-01-23

## 2018-11-12 RX ORDER — ETONOGESTREL AND ETHINYL ESTRADIOL 11.7; 2.7 MG/1; MG/1
INSERT, EXTENDED RELEASE VAGINAL
Qty: 3 EACH | Refills: 3 | Status: SHIPPED | OUTPATIENT
Start: 2018-11-12 | End: 2019-10-05 | Stop reason: SDUPTHER

## 2018-11-12 NOTE — PROGRESS NOTES
Subjective   Vahid Overton is a 28 y.o. female    Chief Complaint   Patient presents with   • Annual Exam   • Gynecologic Exam     Pap     History of Present Illness     Here for PE and pap    Pt requests a full STD work up.  She denies any sx's.     She would like to start BC.  She has previously used NuvaRing and did well.  Stopped this with an insurance change, but would like to restart.      Tdap - 5/2017  Flu shot - updating today  Pap - updating today  Hep A - 8/2017, needs 2nd updated today    Diet - tries to follow a healthy diet    Exercise - works out 3-5 times per week    Social History     Tobacco Use   • Smoking status: Never Smoker   • Smokeless tobacco: Never Used   Substance Use Topics   • Alcohol use: Yes     Comment: Socially on occasion   • Drug use: Yes     Types: Marijuana         The following portions of the patient's history were reviewed and updated as appropriate: allergies, current medications, past family history, past medical history, past social history, past surgical history and problem list.    Current Outpatient Medications:   •  doxycycline (MONODOX) 100 MG capsule, Take 1 capsule by mouth 2 (Two) Times a Day. Derm has her taking for at least 3 months, Disp: , Rfl:   •  etonogestrel-ethinyl estradiol (NUVARING) 0.12-0.015 MG/24HR vaginal ring, Insert vaginally and leave in place for 3 consecutive weeks, then remove for 1 week., Disp: 3 each, Rfl: 3     Review of Systems   Constitutional: Negative for appetite change, chills, fatigue, fever and unexpected weight change.   HENT: Negative for congestion, ear pain, nosebleeds, rhinorrhea and tinnitus.    Eyes: Negative for pain.   Respiratory: Negative for cough, chest tightness and shortness of breath.    Cardiovascular: Negative for chest pain, palpitations and leg swelling.   Gastrointestinal: Negative for abdominal distention, abdominal pain, blood in stool, constipation, diarrhea, nausea and vomiting.   Endocrine: Negative for  cold intolerance, heat intolerance, polydipsia, polyphagia and polyuria.   Genitourinary: Negative for dysuria, flank pain, frequency, hematuria and urgency.   Musculoskeletal: Negative for arthralgias, back pain, gait problem, joint swelling, myalgias and neck pain.   Skin: Negative for color change, pallor, rash and wound.   Allergic/Immunologic: Negative for environmental allergies and food allergies.   Neurological: Negative for dizziness, syncope, weakness, light-headedness, numbness and headaches.   Hematological: Negative for adenopathy. Does not bruise/bleed easily.   Psychiatric/Behavioral: Negative for behavioral problems and suicidal ideas. The patient is not nervous/anxious.        Objective   Physical Exam   Constitutional: She is oriented to person, place, and time. She appears well-developed and well-nourished.   HENT:   Head: Normocephalic and atraumatic.   Right Ear: External ear normal.   Left Ear: External ear normal.   Nose: Nose normal.   Mouth/Throat: Oropharynx is clear and moist.   Eyes: Conjunctivae and EOM are normal. Pupils are equal, round, and reactive to light.   Neck: Normal range of motion. Neck supple.   Cardiovascular: Normal rate, regular rhythm, normal heart sounds and intact distal pulses.   Pulses:       Carotid pulses are 2+ on the right side, and 2+ on the left side.  Pulmonary/Chest: Effort normal and breath sounds normal. Right breast exhibits no inverted nipple, no mass, no nipple discharge, no skin change and no tenderness. Left breast exhibits no inverted nipple, no mass, no nipple discharge, no skin change and no tenderness. Breasts are symmetrical. There is no breast swelling.   Abdominal: Soft. Normal appearance, normal aorta and bowel sounds are normal.   Genitourinary: Vagina normal and uterus normal. No breast tenderness, discharge or bleeding.   Musculoskeletal: Normal range of motion.   Lymphadenopathy:        Head (right side): No tonsillar adenopathy present.     "    Head (left side): No tonsillar adenopathy present.        Right cervical: No superficial cervical and no posterior cervical adenopathy present.       Left cervical: No superficial cervical and no posterior cervical adenopathy present.   Neurological: She is alert and oriented to person, place, and time. She has normal strength and normal reflexes. She displays a negative Romberg sign.   Skin: Skin is warm, dry and intact.   Psychiatric: She has a normal mood and affect. Her behavior is normal. Judgment and thought content normal.   Vitals reviewed.    Vitals:    11/12/18 1030   BP: 110/60   Pulse: 76   Resp: 16   Temp: 97.4 °F (36.3 °C)   TempSrc: Temporal   SpO2: 99%   Weight: 98 kg (216 lb)   Height: 169.5 cm (66.73\")         Assessment/Plan   Vahid was seen today for annual exam and gynecologic exam.    Diagnoses and all orders for this visit:    Annual physical exam  -     POC Urinalysis Dipstick, Automated  -     CBC & Differential  -     Cholesterol, Total  -     Comprehensive Metabolic Panel  -     Vitamin D 25 Hydroxy  -     Vitamin B12  -     TSH  -     Liquid-based Pap Smear, Screening; Future  -     Hepatitis Panel, Acute  -     HSV 1 & 2 - Specific Antibody, IgG  -     HSV Non-Specific Antibody, IgM  -     HIV-1 / O / 2 Ag / Antibody 4th Generation  -     Chlamydia trachomatis, Neisseria gonorrhoeae, PCR - Urine, Urinary Bladder  -     RPR  -     CBC Auto Differential    Encounter for gynecological examination without abnormal finding  -     Liquid-based Pap Smear, Screening; Future    Screen for STD (sexually transmitted disease)  -     Hepatitis Panel, Acute  -     HSV 1 & 2 - Specific Antibody, IgG  -     HSV Non-Specific Antibody, IgM  -     HIV-1 / O / 2 Ag / Antibody 4th Generation  -     Chlamydia trachomatis, Neisseria gonorrhoeae, PCR - Urine, Urinary Bladder  -     RPR    Flu vaccine need  -     Flucelvax Quad=>4Years ([id number])    Need for hepatitis A vaccination  -     Cancel: Hepatitis " A Vaccine Adult IM    Other orders  -     etonogestrel-ethinyl estradiol (NUVARING) 0.12-0.015 MG/24HR vaginal ring; Insert vaginally and leave in place for 3 consecutive weeks, then remove for 1 week.      We will restart the NuvaRing  Labs sent  Pap and breast exam done  STD screening sent per request  Counseling - diet, exercise and safe sex practices.  F/U In 1 year for repeat PE, RTC sooner with any problems

## 2018-11-13 LAB
HSV1 IGG SER IA-ACNC: 41.7 INDEX (ref 0–0.9)
HSV1+2 IGM SER IA-ACNC: <0.91 RATIO (ref 0–0.9)
HSV2 IGG SER IA-ACNC: <0.91 INDEX (ref 0–0.9)

## 2018-11-14 LAB
C TRACH RRNA SPEC DONR QL NAA+PROBE: NEGATIVE
N GONORRHOEA DNA SPEC QL NAA+PROBE: NEGATIVE
RPR SER QL: NORMAL

## 2018-11-16 ENCOUNTER — TELEPHONE (OUTPATIENT)
Dept: INTERNAL MEDICINE | Facility: CLINIC | Age: 28
End: 2018-11-16

## 2018-11-16 DIAGNOSIS — E55.9 VITAMIN D DEFICIENCY: Primary | ICD-10-CM

## 2018-11-16 RX ORDER — CHOLECALCIFEROL (VITAMIN D3) 1250 MCG
50000 CAPSULE ORAL
Qty: 8 CAPSULE | Refills: 0 | Status: SHIPPED | OUTPATIENT
Start: 2018-11-16 | End: 2019-07-11

## 2018-11-16 NOTE — TELEPHONE ENCOUNTER
----- Message from BARTOLOME Stroud sent at 11/16/2018  8:21 AM EST -----  Please let pt know pap was negative for malignancy.

## 2018-11-16 NOTE — TELEPHONE ENCOUNTER
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 Recheck vit d in 3-6 months.   Other labs in acceptable ranges.

## 2019-07-11 ENCOUNTER — OFFICE VISIT (OUTPATIENT)
Dept: INTERNAL MEDICINE | Facility: CLINIC | Age: 29
End: 2019-07-11

## 2019-07-11 VITALS
HEART RATE: 80 BPM | BODY MASS INDEX: 35 KG/M2 | SYSTOLIC BLOOD PRESSURE: 106 MMHG | OXYGEN SATURATION: 99 % | RESPIRATION RATE: 18 BRPM | TEMPERATURE: 97.6 F | HEIGHT: 67 IN | WEIGHT: 223 LBS | DIASTOLIC BLOOD PRESSURE: 66 MMHG

## 2019-07-11 DIAGNOSIS — H10.31 ACUTE BACTERIAL CONJUNCTIVITIS OF RIGHT EYE: Primary | ICD-10-CM

## 2019-07-11 PROCEDURE — 99213 OFFICE O/P EST LOW 20 MIN: CPT | Performed by: NURSE PRACTITIONER

## 2019-07-11 RX ORDER — ERYTHROMYCIN 5 MG/G
OINTMENT OPHTHALMIC
COMMUNITY
Start: 2019-07-10 | End: 2019-10-24

## 2019-07-11 NOTE — PATIENT INSTRUCTIONS
Bacterial Conjunctivitis  Bacterial conjunctivitis is an infection of your conjunctiva. This is the clear membrane that covers the white part of your eye and the inner surface of your eyelid. This condition can make your eye:  · Red or pink.  · Itchy.    This condition is caused by bacteria. This condition spreads very easily from person to person (is contagious) and from one eye to the other eye.  Follow these instructions at home:  Medicines  · Take or apply your antibiotic medicine as told by your doctor. Do not stop taking or applying the antibiotic even if you start to feel better.  · Take or apply over-the-counter and prescription medicines only as told by your doctor.  · Do not touch your eyelid with the eye drop bottle or the ointment tube.  Managing discomfort  · Wipe any fluid from your eye with a warm, wet washcloth or a cotton ball.  · Place a cool, clean washcloth on your eye. Do this for 10-20 minutes, 3-4 times per day.  General instructions  · Do not wear contact lenses until the irritation is gone. Wear glasses until your doctor says it is okay to wear contacts.  · Do not wear eye makeup until your symptoms are gone. Throw away any old makeup.  · Change or wash your pillowcase every day.  · Do not share towels or washcloths with anyone.  · Wash your hands often with soap and water. Use paper towels to dry your hands.  · Do not touch or rub your eyes.  · Do not drive or use heavy machinery if your vision is blurry.  Contact a doctor if:  · You have a fever.  · Your symptoms do not get better after 10 days.  Get help right away if:  · You have a fever and your symptoms suddenly get worse.  · You have very bad pain when you move your eye.  · Your face:  ? Hurts.  ? Is red.  ? Is swollen.  · You have sudden loss of vision.  This information is not intended to replace advice given to you by your health care provider. Make sure you discuss any questions you have with your health care provider.  Document  Released: 09/26/2009 Document Revised: 05/25/2017 Document Reviewed: 09/29/2016  [employer] Interactive Patient Education © 2019 [employer]  Erythromycin eye ointment  What is this medicine?  ERYTHROMYCIN (er ith jorge MYE sin) is a macrolide antibiotic. It is used to treat bacterial eye infections. It also prevents a certain type of eye infection that can occur in some babies.  This medicine may be used for other purposes; ask your health care provider or pharmacist if you have questions.  COMMON BRAND NAME(S): Ilotycin, Romycin  What should I tell my health care provider before I take this medicine?  -if you have an unusual or allergic reaction to erythromycin, foods, dyes, or preservatives  -pregnant or trying to get pregnant  -breast-feeding  How should I use this medicine?  This medicine is only for use in the eye. Follow the directions on the prescription label. Wash hands before and after use. Tilt your head back slightly and pull your lower eyelid down with your index finger to form a pouch. Try not to touch the tip of the tube, to your eye, fingertips, or any other surface. Squeeze the end of the tube to apply a thin layer of the ointment to the inside of the lower eyelid. Close the eye gently to spread the ointment. Your vision may blur for a few minutes. Use your doses at regular intervals. Do not use your medicine more often than directed. Finish the full course prescribed by your doctor or health care professional even if you think your condition is better. Do not stop using except on the advice of your doctor or health care professional.  Talk to your pediatrician regarding the use of this medicine in children. Special care may be needed.  Overdosage: If you think you have taken too much of this medicine contact a poison control center or emergency room at once.  NOTE: This medicine is only for you. Do not share this medicine with others.  What if I miss a dose?  If you miss a dose, use it as soon as  you can. If it is almost time for your next dose, use only that dose. Do not use double or extra doses.  What may interact with this medicine?  Interactions are not expected. Do not use any other eye products without telling your doctor or health care professional.  This list may not describe all possible interactions. Give your health care provider a list of all the medicines, herbs, non-prescription drugs, or dietary supplements you use. Also tell them if you smoke, drink alcohol, or use illegal drugs. Some items may interact with your medicine.  What should I watch for while using this medicine?  Tell your doctor or health care professional if your symptoms do not improve in 2 to 3 days.  What side effects may I notice from receiving this medicine?  Side effects that you should report to your doctor or health care professional as soon as possible:  -allergic reactions like skin rash, itching or hives, swelling of the face, lips, or tongue  -burning, stinging, or itching of the eyes or eyelids  -changes in vision  -redness, swelling, or pain  This list may not describe all possible side effects. Call your doctor for medical advice about side effects. You may report side effects to FDA at 1-661-FDA-9755.  Where should I keep my medicine?  Keep out of the reach of children.  Store at room temperature between 15 and 30 degrees C (59 and 86 degrees F). Do not freeze. Throw away any unused ointment after the expiration date.  NOTE: This sheet is a summary. It may not cover all possible information. If you have questions about this medicine, talk to your doctor, pharmacist, or health care provider.  © 2019 [employer] (2017-01-19 12:24:24)

## 2019-07-11 NOTE — PROGRESS NOTES
Cm Overton is a 29 y.o. female.     Chief Complaint   Patient presents with   • eye irritation     right eye irritation-red, swelling pain. 3 days.  Started eye ointment yesterday       Conjunctivitis    The current episode started 3 to 5 days ago. The onset was sudden. The problem has been unchanged. The problem is moderate. Nothing relieves the symptoms. Nothing aggravates the symptoms. Associated symptoms include eye discharge, eye pain (mild) and eye redness. Pertinent negatives include no fever, no decreased vision, no double vision, no eye itching, no photophobia, no abdominal pain, no congestion, no ear discharge, no ear pain, no headaches, no hearing loss, no mouth sores, no rhinorrhea, no sore throat, no stridor, no swollen glands, no muscle aches and no URI. The eye pain is mild. The right eye is affected. The eye pain is not associated with movement. The eyelid exhibits redness. She has been eating and drinking normally. There were sick contacts at home (friend with pink eye).      Right eye irritation, redness, swelling, and pain. Has been ongoing for 3 days. She just started erythromycin opth ointment yesterday that was sent in by her brother. She has been applying below her eye.        The following portions of the patient's history were reviewed and updated as appropriate: allergies, current medications, past family history, past medical history, past social history, past surgical history and problem list.    Review of Systems   Constitutional: Negative for fever.   HENT: Negative for congestion, ear discharge, ear pain, hearing loss, mouth sores, rhinorrhea and sore throat.    Eyes: Positive for pain (mild), discharge and redness. Negative for double vision, photophobia and itching.   Respiratory: Negative for stridor.    Gastrointestinal: Negative for abdominal pain.   Neurological: Negative for headaches.       Outpatient Medications Marked as Taking for the 7/11/19 encounter  (Office Visit) with Renetta López APRN   Medication Sig Dispense Refill   • Cholecalciferol (VITAMIN D3) 5000 units capsule capsule Take 5,000 Units by mouth Daily.     • doxycycline (MONODOX) 100 MG capsule Take 1 capsule by mouth 2 (Two) Times a Day. Derm has her taking for at least 3 months     • erythromycin (ROMYCIN) 5 MG/GM ophthalmic ointment      • etonogestrel-ethinyl estradiol (NUVARING) 0.12-0.015 MG/24HR vaginal ring Insert vaginally and leave in place for 3 consecutive weeks, then remove for 1 week. 3 each 3         Objective   Physical Exam   Constitutional: She is oriented to person, place, and time. She appears well-developed and well-nourished.   HENT:   Head: Normocephalic and atraumatic.   Eyes: EOM are normal. Pupils are equal, round, and reactive to light. Right eye exhibits discharge. No foreign body present in the right eye. Left eye exhibits no chemosis, no discharge and no exudate. No foreign body present in the left eye. Right conjunctiva is injected.   Neck: Normal range of motion.   Cardiovascular: Normal rate, regular rhythm and normal heart sounds.   Pulmonary/Chest: Effort normal and breath sounds normal.   Abdominal: Soft. Normal appearance and bowel sounds are normal. There is no tenderness.   Musculoskeletal: Normal range of motion.   Neurological: She is alert and oriented to person, place, and time.   Skin: Skin is warm and dry.   Psychiatric: She has a normal mood and affect. Her behavior is normal. Judgment and thought content normal.       Vitals:    07/11/19 0903   BP: 106/66   Pulse: 80   Resp: 18   Temp: 97.6 °F (36.4 °C)   SpO2: 99%         Assessment/Plan   Vahid was seen today for eye irritation.    Diagnoses and all orders for this visit:    Acute bacterial conjunctivitis of right eye              Pt to continue erythromycin opth ointment. Instructed on proper administration techniques.  Advised on when to return.   Good handwashing and prevention of spread  discussed.     Return if symptoms worsen or fail to improve.  Plan of care discussed with pt. They verbalized understanding and agreement.       * Please note that portions of this note were completed with a voice recognition program. Efforts were made to edit the dictation but occasionally words are erroneously transcribed.

## 2019-08-08 PROCEDURE — 80053 COMPREHEN METABOLIC PANEL: CPT | Performed by: NURSE PRACTITIONER

## 2019-08-08 PROCEDURE — 84443 ASSAY THYROID STIM HORMONE: CPT | Performed by: NURSE PRACTITIONER

## 2019-08-08 PROCEDURE — 82306 VITAMIN D 25 HYDROXY: CPT | Performed by: NURSE PRACTITIONER

## 2019-08-08 PROCEDURE — 85025 COMPLETE CBC W/AUTO DIFF WBC: CPT | Performed by: NURSE PRACTITIONER

## 2019-08-08 PROCEDURE — 82607 VITAMIN B-12: CPT | Performed by: NURSE PRACTITIONER

## 2019-08-08 PROCEDURE — 80061 LIPID PANEL: CPT | Performed by: NURSE PRACTITIONER

## 2019-10-04 ENCOUNTER — TELEPHONE (OUTPATIENT)
Dept: INTERNAL MEDICINE | Facility: CLINIC | Age: 29
End: 2019-10-04

## 2019-10-04 NOTE — TELEPHONE ENCOUNTER
RICHA SAYS THAT WHEN SHE WENT TO  HER  PRESCRIBED HER BORIC ACID (VAGINAL SIPOSITORY) [facility]. SHE IS WANTING TO KNOW IF MM WILL PRESCRIBE.

## 2019-10-07 RX ORDER — ETONOGESTREL AND ETHINYL ESTRADIOL 11.7; 2.7 MG/1; MG/1
INSERT, EXTENDED RELEASE VAGINAL
Qty: 1 EACH | Refills: 2 | Status: SHIPPED | OUTPATIENT
Start: 2019-10-07 | End: 2019-12-30

## 2019-10-24 ENCOUNTER — OFFICE VISIT (OUTPATIENT)
Dept: INTERNAL MEDICINE | Facility: CLINIC | Age: 29
End: 2019-10-24

## 2019-10-24 VITALS
HEIGHT: 67 IN | HEART RATE: 82 BPM | RESPIRATION RATE: 16 BRPM | SYSTOLIC BLOOD PRESSURE: 118 MMHG | OXYGEN SATURATION: 99 % | WEIGHT: 222.4 LBS | DIASTOLIC BLOOD PRESSURE: 64 MMHG | BODY MASS INDEX: 34.91 KG/M2 | TEMPERATURE: 97.9 F

## 2019-10-24 DIAGNOSIS — J01.00 ACUTE MAXILLARY SINUSITIS, RECURRENCE NOT SPECIFIED: Primary | ICD-10-CM

## 2019-10-24 DIAGNOSIS — R09.82 PND (POST-NASAL DRIP): ICD-10-CM

## 2019-10-24 DIAGNOSIS — R05.9 COUGH: ICD-10-CM

## 2019-10-24 PROCEDURE — 99213 OFFICE O/P EST LOW 20 MIN: CPT | Performed by: NURSE PRACTITIONER

## 2019-10-24 RX ORDER — BROMPHENIRAMINE MALEATE, PSEUDOEPHEDRINE HYDROCHLORIDE, AND DEXTROMETHORPHAN HYDROBROMIDE 2; 30; 10 MG/5ML; MG/5ML; MG/5ML
10 SYRUP ORAL 4 TIMES DAILY PRN
Qty: 300 ML | Refills: 0 | Status: SHIPPED | OUTPATIENT
Start: 2019-10-24 | End: 2019-10-25

## 2019-10-24 RX ORDER — AZITHROMYCIN 250 MG/1
TABLET, FILM COATED ORAL
Qty: 6 TABLET | Refills: 0 | Status: SHIPPED | OUTPATIENT
Start: 2019-10-24 | End: 2020-01-23

## 2019-10-24 NOTE — PROGRESS NOTES
Subjective   Vahid Overton is a 29 y.o. female    Chief Complaint   Patient presents with   • URI     Sx's since Sunday. Cough, chest congestion, drainage, runny nose, nasal congestion     URI    This is a new problem. The current episode started in the past 7 days. The problem has been gradually worsening. Maximum temperature: but has felt chilled. Associated symptoms include congestion, coughing (productive), headaches, a plugged ear sensation, sinus pain and a sore throat. Pertinent negatives include no abdominal pain, chest pain, diarrhea, dysuria, ear pain, joint pain, joint swelling, nausea, neck pain, rash, rhinorrhea, sneezing, swollen glands, vomiting or wheezing. The treatment provided no relief.   Cough   This is a new problem. The current episode started in the past 7 days. The problem has been waxing and waning. The problem occurs every few minutes. The cough is non-productive. Associated symptoms include ear congestion, headaches, nasal congestion, postnasal drip and a sore throat. Pertinent negatives include no chest pain, chills, ear pain, fever, heartburn, hemoptysis, myalgias, rash, rhinorrhea, shortness of breath, sweats, weight loss or wheezing. Nothing aggravates the symptoms. She has tried nothing for the symptoms. The treatment provided no relief. There is no history of asthma, bronchiectasis, bronchitis, COPD, emphysema, environmental allergies or pneumonia.        The following portions of the patient's history were reviewed and updated as appropriate: allergies, current medications, past family history, past medical history, past social history, past surgical history and problem list.    Current Outpatient Medications:   •  Cholecalciferol (VITAMIN D3) 5000 units capsule capsule, Take 5,000 Units by mouth Daily., Disp: , Rfl:   •  doxycycline (MONODOX) 100 MG capsule, Take 1 capsule by mouth 2 (Two) Times a Day. Derm has her taking for at least 3 months, Disp: , Rfl:   •  azithromycin  (ZITHROMAX Z-ALDAIR) 250 MG tablet, Take 2 tablets the first day, then 1 tablet daily for 4 days., Disp: 6 tablet, Rfl: 0  •  benzonatate (TESSALON PERLES) 100 MG capsule, 1-2 PO TID PRN for cough, Disp: 90 capsule, Rfl: 0  •  etonogestrel-ethinyl estradiol (NUVARING) 0.12-0.015 MG/24HR vaginal ring, INSERT ONE RING VAGINALLY FOR 21 DAYS, THEN REMOVE FOR 7 DAYS, Disp: 1 each, Rfl: 2     Review of Systems   Constitutional: Negative for chills, fatigue, fever and weight loss.   HENT: Positive for congestion, postnasal drip, sinus pain and sore throat. Negative for ear pain, rhinorrhea and sneezing.    Respiratory: Positive for cough (productive). Negative for hemoptysis, chest tightness, shortness of breath and wheezing.    Cardiovascular: Negative for chest pain.   Gastrointestinal: Negative for abdominal pain, diarrhea, heartburn, nausea and vomiting.   Endocrine: Negative for cold intolerance and heat intolerance.   Genitourinary: Negative for dysuria.   Musculoskeletal: Negative for arthralgias, joint pain, myalgias and neck pain.   Skin: Negative for rash.   Allergic/Immunologic: Negative for environmental allergies.   Neurological: Positive for headaches. Negative for dizziness.       Objective   Physical Exam   Constitutional: She is oriented to person, place, and time. She appears well-developed and well-nourished.   HENT:   Head: Normocephalic and atraumatic.   Right Ear: A middle ear effusion is present.   Left Ear: A middle ear effusion is present.   Nose: Right sinus exhibits maxillary sinus tenderness and frontal sinus tenderness. Left sinus exhibits maxillary sinus tenderness and frontal sinus tenderness.   Mouth/Throat: Posterior oropharyngeal erythema present.   Eyes: Conjunctivae and EOM are normal. Pupils are equal, round, and reactive to light.   Neck: Normal range of motion.   Cardiovascular: Normal rate, regular rhythm and normal heart sounds.   Pulmonary/Chest: Effort normal and breath sounds  "normal.   Abdominal: Soft. Bowel sounds are normal.   Musculoskeletal: Normal range of motion.   Neurological: She is alert and oriented to person, place, and time. She has normal reflexes.   Skin: Skin is warm and dry.   Psychiatric: She has a normal mood and affect. Her behavior is normal. Judgment and thought content normal.     Vitals:    10/24/19 1152   BP: 118/64   Pulse: 82   Resp: 16   Temp: 97.9 °F (36.6 °C)   TempSrc: Temporal   SpO2: 99%   Weight: 101 kg (222 lb 6.4 oz)   Height: 169.5 cm (66.73\")         Assessment/Plan   Vahid was seen today for uri.    Diagnoses and all orders for this visit:    Acute maxillary sinusitis, recurrence not specified  -     azithromycin (ZITHROMAX Z-ALDAIR) 250 MG tablet; Take 2 tablets the first day, then 1 tablet daily for 4 days.    PND (post-nasal drip)  -     Discontinue: brompheniramine-pseudoephedrine-DM 30-2-10 MG/5ML syrup; Take 10 mL by mouth 4 (Four) Times a Day As Needed for Congestion or Cough.    Cough  -     Discontinue: brompheniramine-pseudoephedrine-DM 30-2-10 MG/5ML syrup; Take 10 mL by mouth 4 (Four) Times a Day As Needed for Congestion or Cough.  -     benzonatate (TESSALON PERLES) 100 MG capsule; 1-2 PO TID PRN for cough      Meds as directed  Increase fluids  RTC If sx's worsen or do not improve             "

## 2019-10-25 ENCOUNTER — TELEPHONE (OUTPATIENT)
Dept: INTERNAL MEDICINE | Facility: CLINIC | Age: 29
End: 2019-10-25

## 2019-10-25 RX ORDER — BENZONATATE 100 MG/1
CAPSULE ORAL
Qty: 90 CAPSULE | Refills: 0 | Status: SHIPPED | OUTPATIENT
Start: 2019-10-25 | End: 2020-01-23

## 2019-10-25 NOTE — TELEPHONE ENCOUNTER
roly says that the med sent to pharmacy for cough is not covered by her insurance, she would like to know if you could send different med in that would covered.

## 2019-12-30 RX ORDER — ETONOGESTREL AND ETHINYL ESTRADIOL 11.7; 2.7 MG/1; MG/1
INSERT, EXTENDED RELEASE VAGINAL
Qty: 1 EACH | Refills: 1 | Status: SHIPPED | OUTPATIENT
Start: 2019-12-30 | End: 2020-01-23 | Stop reason: SDUPTHER

## 2020-01-23 ENCOUNTER — OFFICE VISIT (OUTPATIENT)
Dept: INTERNAL MEDICINE | Facility: CLINIC | Age: 30
End: 2020-01-23

## 2020-01-23 VITALS
SYSTOLIC BLOOD PRESSURE: 106 MMHG | HEIGHT: 67 IN | DIASTOLIC BLOOD PRESSURE: 62 MMHG | HEART RATE: 78 BPM | WEIGHT: 225.6 LBS | BODY MASS INDEX: 35.41 KG/M2 | OXYGEN SATURATION: 99 % | TEMPERATURE: 97.2 F

## 2020-01-23 DIAGNOSIS — B00.1 FEVER BLISTER: Primary | ICD-10-CM

## 2020-01-23 PROCEDURE — 99213 OFFICE O/P EST LOW 20 MIN: CPT | Performed by: NURSE PRACTITIONER

## 2020-01-23 RX ORDER — ETONOGESTREL AND ETHINYL ESTRADIOL 11.7; 2.7 MG/1; MG/1
INSERT, EXTENDED RELEASE VAGINAL
Qty: 1 EACH | Refills: 0 | Status: SHIPPED | OUTPATIENT
Start: 2020-01-23 | End: 2020-02-03

## 2020-01-23 RX ORDER — VALACYCLOVIR HYDROCHLORIDE 1 G/1
2000 TABLET, FILM COATED ORAL 2 TIMES DAILY
Qty: 30 TABLET | Refills: 1 | Status: SHIPPED | OUTPATIENT
Start: 2020-01-23 | End: 2020-01-24

## 2020-01-23 NOTE — PROGRESS NOTES
Subjective   Vahid Overton is a 29 y.o. female    Chief Complaint   Patient presents with   • Mouth Lesions     happened this morning.     History of Present Illness     Woke with cold sore on left side of mouth this am.  States that she gets them once every couple years.  Has taken Abreva in the past and it doesn't work too well.  Has heard that Famvir or Acyclovir works well.        The following portions of the patient's history were reviewed and updated as appropriate: allergies, current medications, past family history, past medical history, past social history, past surgical history and problem list.    Current Outpatient Medications:   •  Cholecalciferol (VITAMIN D3) 5000 units capsule capsule, Take 5,000 Units by mouth Daily., Disp: , Rfl:   •  etonogestrel-ethinyl estradiol (NUVARING) 0.12-0.015 MG/24HR vaginal ring, INSERT ONE RING VAGINALLY FOR 21 DAYS, THEN REMOVE FOR 7 DAYS, Disp: 1 each, Rfl: 0  •  valACYclovir (VALTREX) 1000 MG tablet, Take 2 tablets by mouth 2 (Two) Times a Day for 1 day., Disp: 30 tablet, Rfl: 1     Review of Systems   Constitutional: Negative for chills, fatigue and fever.   HENT: Positive for mouth sores.    Respiratory: Negative for cough, chest tightness and shortness of breath.    Cardiovascular: Negative for chest pain.   Gastrointestinal: Negative for abdominal pain, diarrhea, nausea and vomiting.   Endocrine: Negative for cold intolerance and heat intolerance.   Musculoskeletal: Negative for arthralgias.   Neurological: Negative for dizziness.       Objective   Physical Exam   Constitutional: She is oriented to person, place, and time. She appears well-developed and well-nourished.   HENT:   Head: Normocephalic and atraumatic.   Mouth/Throat: Oral lesions present.       Eyes: Pupils are equal, round, and reactive to light. Conjunctivae and EOM are normal.   Neck: Normal range of motion.   Cardiovascular: Normal rate, regular rhythm and normal heart sounds.  "  Pulmonary/Chest: Effort normal and breath sounds normal.   Abdominal: Soft. Bowel sounds are normal.   Musculoskeletal: Normal range of motion.   Neurological: She is alert and oriented to person, place, and time. She has normal reflexes.   Skin: Skin is warm and dry.   Psychiatric: She has a normal mood and affect. Her behavior is normal. Judgment and thought content normal.     Vitals:    01/23/20 1229   BP: 106/62   BP Location: Right arm   Cuff Size: Large Adult   Pulse: 78   Temp: 97.2 °F (36.2 °C)   SpO2: 99%   Weight: 102 kg (225 lb 9.6 oz)   Height: 169.5 cm (66.73\")         Assessment/Plan   Vahid was seen today for mouth lesions.    Diagnoses and all orders for this visit:    Fever blister  -     valACYclovir (VALTREX) 1000 MG tablet; Take 2 tablets by mouth 2 (Two) Times a Day for 1 day.    Other orders  -     etonogestrel-ethinyl estradiol (NUVARING) 0.12-0.015 MG/24HR vaginal ring; INSERT ONE RING VAGINALLY FOR 21 DAYS, THEN REMOVE FOR 7 DAYS      Valtrex as directed for fever bilster  Nuvaring refilled until her upcoming PE on 2/3/2020           "

## 2020-02-03 ENCOUNTER — OFFICE VISIT (OUTPATIENT)
Dept: INTERNAL MEDICINE | Facility: CLINIC | Age: 30
End: 2020-02-03

## 2020-02-03 VITALS
HEIGHT: 67 IN | OXYGEN SATURATION: 99 % | BODY MASS INDEX: 35.69 KG/M2 | WEIGHT: 227.4 LBS | HEART RATE: 62 BPM | TEMPERATURE: 97.6 F | RESPIRATION RATE: 16 BRPM | DIASTOLIC BLOOD PRESSURE: 60 MMHG | SYSTOLIC BLOOD PRESSURE: 106 MMHG

## 2020-02-03 DIAGNOSIS — Z11.3 SCREENING FOR STD (SEXUALLY TRANSMITTED DISEASE): ICD-10-CM

## 2020-02-03 DIAGNOSIS — Z00.00 ANNUAL PHYSICAL EXAM: Primary | ICD-10-CM

## 2020-02-03 DIAGNOSIS — Z30.9 ENCOUNTER FOR CONTRACEPTIVE MANAGEMENT, UNSPECIFIED TYPE: ICD-10-CM

## 2020-02-03 DIAGNOSIS — Z23 NEED FOR HEPATITIS A IMMUNIZATION: ICD-10-CM

## 2020-02-03 DIAGNOSIS — Z01.419 ROUTINE GYNECOLOGICAL EXAMINATION: ICD-10-CM

## 2020-02-03 PROCEDURE — 90471 IMMUNIZATION ADMIN: CPT | Performed by: NURSE PRACTITIONER

## 2020-02-03 PROCEDURE — 87661 TRICHOMONAS VAGINALIS AMPLIF: CPT | Performed by: NURSE PRACTITIONER

## 2020-02-03 PROCEDURE — 90632 HEPA VACCINE ADULT IM: CPT | Performed by: NURSE PRACTITIONER

## 2020-02-03 PROCEDURE — 87798 DETECT AGENT NOS DNA AMP: CPT | Performed by: NURSE PRACTITIONER

## 2020-02-03 PROCEDURE — 99395 PREV VISIT EST AGE 18-39: CPT | Performed by: NURSE PRACTITIONER

## 2020-02-03 PROCEDURE — 87491 CHLMYD TRACH DNA AMP PROBE: CPT | Performed by: NURSE PRACTITIONER

## 2020-02-03 PROCEDURE — 87591 N.GONORRHOEAE DNA AMP PROB: CPT | Performed by: NURSE PRACTITIONER

## 2020-02-03 PROCEDURE — 87801 DETECT AGNT MULT DNA AMPLI: CPT | Performed by: NURSE PRACTITIONER

## 2020-02-03 RX ORDER — ETONOGESTREL AND ETHINYL ESTRADIOL 11.7; 2.7 MG/1; MG/1
INSERT, EXTENDED RELEASE VAGINAL
Status: CANCELLED | OUTPATIENT
Start: 2020-02-03

## 2020-02-03 RX ORDER — LEVONORGESTREL AND ETHINYL ESTRADIOL 0.15-0.03
1 KIT ORAL DAILY
Qty: 91 TABLET | Refills: 3 | Status: SHIPPED | OUTPATIENT
Start: 2020-02-03 | End: 2020-02-13

## 2020-02-03 NOTE — PROGRESS NOTES
Subjective   Vahid Overton is a 29 y.o. female    Chief Complaint   Patient presents with   • Annual Exam   • Gynecologic Exam     pap     History of Present Illness     Here for PE    Vit D Def-Takes OTC Vit D when she remembers, is trying to do better    NuvaRing-Has been leaving in for 4 weeks because she doesn't want to have a period.  Was advised by pharmacy that she could leave in the extra week.  Also inquiring about Rx for Boric Acid vaginal suppository for BV.  Pt is not in monogamous relationship.  Does not use barrier method for protection against STD.      Tdap - 5/2017  Flu shot - 2018-declines today  Pap - 11/12/2018-updating today  Hep A - #1 8/2017, #2 today     Diet - tries to follow a healthy diet-watches carbs     Exercise - works out 4 times per week-started Judo    Marijuana use a few times/ week--counseled on use     Social History     Tobacco Use   • Smoking status: Never Smoker   • Smokeless tobacco: Never Used   Substance Use Topics   • Alcohol use: Yes     Comment: Socially on occasion   • Drug use: Yes     Types: Marijuana         The following portions of the patient's history were reviewed and updated as appropriate: allergies, current medications, past family history, past medical history, past social history, past surgical history and problem list.    Current Outpatient Medications:   •  Cholecalciferol (VITAMIN D3) 5000 units capsule capsule, Take 5,000 Units by mouth Daily., Disp: , Rfl:   •  levonorgestrel-ethinyl estradiol (SEASONALE) 0.15-0.03 MG per tablet, Take 1 tablet by mouth Daily., Disp: 91 tablet, Rfl: 3     Review of Systems   Constitutional: Negative for appetite change, chills, fatigue, fever and unexpected weight change.   HENT: Negative for congestion, ear pain, nosebleeds, rhinorrhea and tinnitus.    Eyes: Negative for pain.   Respiratory: Negative for cough, chest tightness and shortness of breath.    Cardiovascular: Negative for chest pain, palpitations and leg  swelling.   Gastrointestinal: Negative for abdominal distention, abdominal pain, blood in stool, constipation, diarrhea, nausea and vomiting.   Endocrine: Negative for cold intolerance, heat intolerance, polydipsia, polyphagia and polyuria.   Genitourinary: Negative for dysuria, flank pain, frequency, hematuria and urgency.   Musculoskeletal: Negative for arthralgias, back pain, gait problem, joint swelling, myalgias and neck pain.   Skin: Negative for color change, pallor, rash and wound.   Allergic/Immunologic: Negative for environmental allergies and food allergies.   Neurological: Negative for dizziness, syncope, weakness, light-headedness, numbness and headaches.   Hematological: Negative for adenopathy. Does not bruise/bleed easily.   Psychiatric/Behavioral: Negative for behavioral problems and suicidal ideas. The patient is not nervous/anxious.        Objective   Physical Exam   Constitutional: She is oriented to person, place, and time. She appears well-developed and well-nourished.   HENT:   Head: Normocephalic and atraumatic.   Right Ear: External ear normal.   Left Ear: External ear normal.   Nose: Nose normal.   Mouth/Throat: Oropharynx is clear and moist.   Eyes: Pupils are equal, round, and reactive to light. Conjunctivae and EOM are normal.   Neck: Normal range of motion. Neck supple.   Cardiovascular: Normal rate, regular rhythm, normal heart sounds and intact distal pulses.   Pulses:       Carotid pulses are 2+ on the right side, and 2+ on the left side.  Pulmonary/Chest: Effort normal and breath sounds normal. Right breast exhibits no inverted nipple, no mass, no nipple discharge, no skin change and no tenderness. Left breast exhibits no inverted nipple, no mass, no nipple discharge, no skin change and no tenderness. No breast swelling, tenderness, discharge or bleeding. Breasts are symmetrical.   Abdominal: Soft. Normal appearance, normal aorta and bowel sounds are normal.   Genitourinary: Vagina  "normal and uterus normal. No breast swelling, tenderness, discharge or bleeding.   Musculoskeletal: Normal range of motion.   Lymphadenopathy:        Head (right side): No tonsillar adenopathy present.        Head (left side): No tonsillar adenopathy present.        Right cervical: No superficial cervical and no posterior cervical adenopathy present.       Left cervical: No superficial cervical and no posterior cervical adenopathy present.   Neurological: She is alert and oriented to person, place, and time. She has normal strength and normal reflexes. She displays a negative Romberg sign.   Skin: Skin is warm, dry and intact.   Psychiatric: She has a normal mood and affect. Her behavior is normal. Judgment and thought content normal.   Vitals reviewed.    Vitals:    02/03/20 1303   BP: 106/60   Pulse: 62   Resp: 16   Temp: 97.6 °F (36.4 °C)   TempSrc: Temporal   SpO2: 99%   Weight: 103 kg (227 lb 6.4 oz)   Height: 169.5 cm (66.73\")         Assessment/Plan   Vahid was seen today for annual exam and gynecologic exam.    Diagnoses and all orders for this visit:    Annual physical exam    Routine gynecological examination  -     Liquid-based Pap Smear, Screening; Future    Encounter for contraceptive management, unspecified type  -     levonorgestrel-ethinyl estradiol (SEASONALE) 0.15-0.03 MG per tablet; Take 1 tablet by mouth Daily.    Need for hepatitis A immunization  -     Hepatitis A Vaccine Adult IM    Screening for STD (sexually transmitted disease)  -     Nuab VG+ - Swab, Vagina    Labs reviewed from 8/2019 - WNL, no need to repeat  D/C Nuvaring, will start Seasonale   Hep A updated  Nuswab sent  Discussed that I am not comfortable sending in Boric Acid for PRN use, she states, \"I will just order it from [facility].\"  Counseling - diet, exercise and safe sex  Return in about 4 months (around 6/3/2020).               "

## 2020-02-07 LAB
A VAGINAE DNA VAG QL NAA+PROBE: ABNORMAL SCORE
BVAB2 DNA VAG QL NAA+PROBE: ABNORMAL SCORE
C ALBICANS DNA VAG QL NAA+PROBE: NEGATIVE
C GLABRATA DNA VAG QL NAA+PROBE: NEGATIVE
C TRACH RRNA SPEC DONR QL NAA+PROBE: NEGATIVE
MEGASPHAERA 1: ABNORMAL SCORE
N GONORRHOEA DNA SPEC QL NAA+PROBE: NEGATIVE
T VAGINALIS RRNA GENITAL QL PROBE: NEGATIVE

## 2020-02-12 ENCOUNTER — TELEPHONE (OUTPATIENT)
Dept: INTERNAL MEDICINE | Facility: CLINIC | Age: 30
End: 2020-02-12

## 2020-02-12 NOTE — TELEPHONE ENCOUNTER
PATIENT CALLED AND WANTS TO STAY WITH NUVARING. PLEASE SEND PRESCRIPTION TO     [facility] [address] [phone]    PATIENT CALL BACK NUMBER [phone]

## 2020-02-13 DIAGNOSIS — Z30.9 ENCOUNTER FOR CONTRACEPTIVE MANAGEMENT, UNSPECIFIED TYPE: ICD-10-CM

## 2020-02-13 DIAGNOSIS — N76.0 BACTERIAL VAGINOSIS: ICD-10-CM

## 2020-02-13 DIAGNOSIS — R87.810 ASCUS WITH POSITIVE HIGH RISK HPV CERVICAL: ICD-10-CM

## 2020-02-13 DIAGNOSIS — R87.620 ASCUS WITH POSITIVE HIGH RISK HUMAN PAPILLOMAVIRUS OF VAGINA: Primary | ICD-10-CM

## 2020-02-13 DIAGNOSIS — R87.610 ASCUS WITH POSITIVE HIGH RISK HPV CERVICAL: ICD-10-CM

## 2020-02-13 DIAGNOSIS — B96.89 BACTERIAL VAGINOSIS: ICD-10-CM

## 2020-02-13 DIAGNOSIS — R87.811 ASCUS WITH POSITIVE HIGH RISK HUMAN PAPILLOMAVIRUS OF VAGINA: Primary | ICD-10-CM

## 2020-02-13 RX ORDER — METRONIDAZOLE 500 MG/1
500 TABLET ORAL 2 TIMES DAILY
Qty: 14 TABLET | Refills: 0 | Status: SHIPPED | OUTPATIENT
Start: 2020-02-13 | End: 2020-02-20

## 2020-02-13 RX ORDER — LEVONORGESTREL AND ETHINYL ESTRADIOL 0.15-0.03
1 KIT ORAL DAILY
Qty: 91 TABLET | Refills: 3
Start: 2020-02-13 | End: 2021-01-29

## 2020-02-13 NOTE — TELEPHONE ENCOUNTER
Please let pt know that her pap smear was abn showing atypical cells and was + for HPV.  I am going to refer her to GYN for monitoring.    Her Nuswab was + for BV.  I will send in an antibiotic to treat.      Based on these results, I do not think that the nuvaring is the best option.  Let's hold off on changing until she sees GYN and speaks to them about her options.

## 2020-02-13 NOTE — TELEPHONE ENCOUNTER
Vahid has been notified of results and verbalized understanding. I answered the questions that she had and she is fine to proceed with the referral to GYN.

## 2020-03-02 ENCOUNTER — TELEPHONE (OUTPATIENT)
Dept: INTERNAL MEDICINE | Facility: CLINIC | Age: 30
End: 2020-03-02

## 2020-03-02 NOTE — TELEPHONE ENCOUNTER
Spoke with pt and advised of provider comments. She verbalized good understanding, thanked our office and we ended the call.

## 2020-03-02 NOTE — TELEPHONE ENCOUNTER
Pt called and said she recently had a pap smear come back with abnormal results and wanted to know if her previous pap smears were checked for HPV or abnormal     Callback at [phone]

## 2020-06-03 NOTE — THERAPY TREATMENT NOTE
Outpatient Physical Therapy Ortho Treatment Note  [facility]     Patient Name: Vahid Overton  : 1990  MRN: [id number]  Today's Date: 10/25/2017      Visit Date: 10/25/2017    Visit Dx:    ICD-10-CM ICD-9-CM   1. Achilles tendon tear, right, initial encounter S86.011A 845.09       Patient Active Problem List   Diagnosis   • Laceration of Achilles tendon, sequela        Past Medical History:   Diagnosis Date   • History of Papanicolaou smear of cervix 2017    Student clinic at [facility]                 PT Assessment/Plan       10/25/17 1145       PT Assessment    Assessment Comments Client has some pain and tenderness with activity.  Overall exercises in the clinic were tolerated without complaints.  She has quite a bit of increased soft tissue texture at the Achilles tendon.  She did report feeling better after ASTYM.  -MM     PT Plan    PT Plan Comments Continue per plan of treatment with manual therapy and exercises.  -MM       User Key  (r) = Recorded By, (t) = Taken By, (c) = Cosigned By    Initials Name Provider Type    DARIA Calle, PT Physical Therapist                    Exercises       10/25/17 1145          Subjective Comments    Subjective Comments Client reports Achilles pain today at 4/10.  -MM      Subjective Pain    Able to rate subjective pain? yes  -MM      Pre-Treatment Pain Level 4  -MM      Post-Treatment Pain Level 1  -MM      Exercise 1    Exercise Name 1 Included exercises in clinical setting per flow sheet.  -MM      Cueing 1 Verbal;Demo  -MM      Time (Minutes) 1 10  -MM      Additional Comments Therapeutic exercise  -MM        User Key  (r) = Recorded By, (t) = Taken By, (c) = Cosigned By    Initials Name Provider Type    DARIA Calle, PT Physical Therapist                        Manual Rx (last 36 hours)      Manual Treatments       10/25/17 1145          Manual Rx 1    Manual Rx 1 Location Right Achilles and lower leg  -MM      Manual Rx 1 Type  OD SUSPICIOUS ON FA, NO DEFINITIVE CNVM. Provided soft tissue mobilization using ASTYM technique  -MM      Manual Rx 1 Duration 20  -MM        User Key  (r) = Recorded By, (t) = Taken By, (c) = Cosigned By    Initials Name Provider Type    MM Lul Calle, PT Physical Therapist                        Time Calculation:   Start Time: 1145  Total Timed Code Minutes- PT: 30 minute(s)    Therapy Charges for Today     Code Description Service Date Service Provider Modifiers Qty    [id number]  PT MANUAL THERAPY EA 15 MIN 10/25/2017 Lul Calle, PT GP 1    [id number]  PT THER PROC EA 15 MIN 10/25/2017 Lul Calle, PT GP 1                    Lul Calle, PT  10/25/2017

## 2020-06-18 ENCOUNTER — TELEPHONE (OUTPATIENT)
Dept: INTERNAL MEDICINE | Facility: CLINIC | Age: 30
End: 2020-06-18

## 2020-06-18 NOTE — TELEPHONE ENCOUNTER
Spoke with Patient and she dropped off a form that needs to be filled out from her physical she had done in Feb for her employer. I let her know that she would need to go to a  or Health department for a TB skin test. I told her I would call as soon as we get this completed. She is really hoping for it to be done by 12 tomorrow afternoon. I told her I could not guarantee this, but I would call as soon as its completed. She verbalized understanding.

## 2021-01-29 DIAGNOSIS — Z30.9 ENCOUNTER FOR CONTRACEPTIVE MANAGEMENT, UNSPECIFIED TYPE: ICD-10-CM

## 2021-01-29 RX ORDER — LEVONORGESTREL AND ETHINYL ESTRADIOL 0.15-0.03
KIT ORAL
Qty: 91 TABLET | Refills: 3 | Status: SHIPPED | OUTPATIENT
Start: 2021-01-29 | End: 2022-03-02

## 2021-08-02 NOTE — TELEPHONE ENCOUNTER
Controlled  DASH diet  Continue amlodipine 5mg Qd  Patient notified that form is completed. She will be by to  this afternoon.

## 2021-08-09 ENCOUNTER — OFFICE VISIT (OUTPATIENT)
Dept: INTERNAL MEDICINE | Facility: CLINIC | Age: 31
End: 2021-08-09

## 2021-08-09 VITALS
BODY MASS INDEX: 35.79 KG/M2 | TEMPERATURE: 97.1 F | OXYGEN SATURATION: 98 % | HEIGHT: 67 IN | SYSTOLIC BLOOD PRESSURE: 98 MMHG | WEIGHT: 228 LBS | DIASTOLIC BLOOD PRESSURE: 52 MMHG | HEART RATE: 64 BPM

## 2021-08-09 DIAGNOSIS — L73.2 HIDRADENITIS SUPPURATIVA: ICD-10-CM

## 2021-08-09 DIAGNOSIS — L05.91 PILONIDAL CYST: Primary | ICD-10-CM

## 2021-08-09 PROCEDURE — 99213 OFFICE O/P EST LOW 20 MIN: CPT | Performed by: PHYSICIAN ASSISTANT

## 2021-08-09 RX ORDER — BENZOYL PEROXIDE 100 MG/ML
LIQUID TOPICAL
COMMUNITY
Start: 2021-08-02 | End: 2022-02-28 | Stop reason: SDUPTHER

## 2021-08-09 RX ORDER — DOXYCYCLINE HYCLATE 100 MG/1
100 CAPSULE ORAL 2 TIMES DAILY
Qty: 20 CAPSULE | Refills: 1 | Status: SHIPPED | OUTPATIENT
Start: 2021-08-09

## 2021-08-09 RX ORDER — DOXYCYCLINE HYCLATE 100 MG/1
100 CAPSULE ORAL DAILY
COMMUNITY
Start: 2021-08-02 | End: 2021-08-09 | Stop reason: SDUPTHER

## 2021-08-09 RX ORDER — CLINDAMYCIN PHOSPHATE 11.9 MG/ML
SOLUTION TOPICAL
COMMUNITY
Start: 2021-08-02

## 2021-08-09 RX ORDER — MULTIPLE VITAMINS W/ MINERALS TAB 9MG-400MCG
1 TAB ORAL DAILY
COMMUNITY

## 2021-08-09 NOTE — ASSESSMENT & PLAN NOTE
Clinic Care Coordination Contact    Clinic Care Coordination Contact  OUTREACH    Referral Information:  Referral Source: ED Follow-Up    Primary Diagnosis: Other (include Comment box)(migraine)    Chief Complaint   Patient presents with     Clinic Care Coordination - [facility]     Care Team        Universal Utilization: Ed follow up call  Clinic Utilization  Difficulty keeping appointments:: No  Compliance Concerns: No  No-Show Concerns: No  No PCP office visit in Past Year: No  Utilization    Last refreshed: 1/9/2020  1:38 PM:  Hospital Admissions 0           Last refreshed: 1/9/2020  1:38 PM:  ED Visits 0           Last refreshed: 1/9/2020  1:38 PM:  No Show Count (past year) 0              Current as of: 1/9/2020  1:38 PM              Clinical Concerns:  Current Medical Concerns:  Patient was in [facility] ED for headache. Patient reports he is doing much better today. Patient has follow up appointment with pain clinic this month and will plan on following up with PCP. No other concerns reported.     Current Behavioral Concerns: none    Education Provided to patient: Encouraged follow up appointment with PCP.        Health Maintenance Reviewed:  Health Maintenance Due   Topic Date Due     HIV SCREENING  09/15/1986     PNEUMOCOCCAL IMMUNIZATION 19-64 MEDIUM RISK (1 of 1 - PPSV23) 09/15/1990     PREVENTIVE CARE VISIT  05/09/2013     LIPID  05/09/2017     INFLUENZA VACCINE (1) 09/01/2019     PHQ-9  01/05/2020     URINE DRUG SCREEN  01/23/2020      Medication Management:  Patient is knowledgeable on medications. Patient has no concerns to report at this time. Patient is adherent to medication plan. No financial concerns reported at this time.        Functional Status:  Dependent ADLs:: Independent  Dependent IADLs:: Independent  Bed or wheelchair confined:: No  Mobility Status: Independent    Living Situation:  Current living arrangement:: I live in a private home with spouse  Type of residence:: Private home  Refer to surgery for eval, not currently infected, will send in course of doxy to take if has sx of infection such as tenderness/pain/pus.    - stairs    Diet/Exercise/Sleep:  Diet:: Regular  Inadequate nutrition (GOAL):: No  Food Insecurity: No  Tube Feeding: No  Exercise:: Currently not exercising  Inadequate activity/exercise (GOAL):: No  Significant changes in sleep pattern (GOAL): No    Transportation:  Transportation concerns (GOAL):: No  Transportation means:: Regular car     Psychosocial:  Mormonism or spiritual beliefs that impact treatment:: No  Mental health DX:: No  Mental health management concern (GOAL):: No  Informal Support system:: Family, Friends, Spouse     Financial/Insurance:   Financial/Insurance concerns (GOAL):: No       Resources and Interventions:  Current Resources:    ;   Community Resources: None  Supplies used at home:: None  Equipment Currently Used at Home: none    Advance Care Plan/Directive  Advanced Care Plan/Directive Status: Not Applicable    Referrals Placed: None          Future Appointments              In 1 week Carolann Motley MD [facility], [facility]          Plan: 1) Patient will continue to follow treatment plan as directed and follow up with PCP with concerns ongoing.   2) Care Coordination to remain available for future needs.   John Franz RN  Clinic Care Coordinator  [phone]

## 2021-08-09 NOTE — PROGRESS NOTES
Chief Complaint  Cyst (on crack of buttocks) and Med Refill (doxycycline)    Subjective          History of Present Illness  Vahid Overton presents to [facility] for   Cyst/Boil:  She noticed some tenderness/pain in her butt crack that started a month ago after going to the beach and riding in a car 10 hrs. It was not easing up, had some purulent d/c so she restarted her doxycycline a few weeks ago (took 5-7d course) and it got a little smaller. Due to hx of recurrent boils and sulfa allergy she would take a prn course of doxy if she would get a boil. She normally gets them under her breasts or axilla. She has been using triple abx oint and warm compress. She is thinking she should see surgery since it has been going on for over a month. She has not had fever, and it is currently not red/tender but still occ getting pus out and there is still a hole there.      Review of Systems   Constitutional: Negative for fever and unexpected weight loss.   Respiratory: Negative for cough, shortness of breath and wheezing.    Cardiovascular: Negative for chest pain and palpitations.   Skin: Positive for skin lesions.       The following portions of the patient's history were reviewed and updated as appropriate: allergies, current medications, past family history, past medical history, past social history, past surgical history and problem list.  Allergies   Allergen Reactions   • Sulfa Antibiotics Rash     Current Outpatient Medications on File Prior to Visit   Medication Sig Dispense Refill   • benzoyl peroxide 10 % external wash      • Cholecalciferol (VITAMIN D3) 5000 units capsule capsule Take 5,000 Units by mouth Daily.     • clindamycin (CLEOCIN T) 1 % external solution      • levonorgestrel-ethinyl estradiol (SEASONALE) 0.15-0.03 MG per tablet TAKE 1 TABLET BY MOUTH EVERY DAY 91 tablet 3   • multivitamin with minerals tablet tablet Take 1 tablet by mouth Daily.     • [DISCONTINUED]  "doxycycline (VIBRAMYCIN) 100 MG capsule Take 100 mg by mouth Daily.       No current facility-administered medications on file prior to visit.     New Medications Ordered This Visit   Medications   • mupirocin (Bactroban) 2 % ointment     Sig: Apply  topically to the appropriate area as directed 3 (Three) Times a Day.     Dispense:  15 g     Refill:  0   • doxycycline (VIBRAMYCIN) 100 MG capsule     Sig: Take 1 capsule by mouth 2 (Two) Times a Day.     Dispense:  20 capsule     Refill:  1       Social History     Tobacco Use   Smoking Status Never Smoker   Smokeless Tobacco Never Used        Objective   Vital Signs:   Vitals:    08/09/21 0820   BP: 98/52   Pulse: 64   Temp: 97.1 °F (36.2 °C)   TempSrc: Temporal   SpO2: 98%   Weight: 103 kg (228 lb)   Height: 169.5 cm (66.73\")      Physical Exam  Vitals reviewed.   Constitutional:       General: She is not in acute distress.     Appearance: Normal appearance.   HENT:      Head: Normocephalic and atraumatic.   Eyes:      General: No scleral icterus.     Extraocular Movements: Extraocular movements intact.      Conjunctiva/sclera: Conjunctivae normal.   Cardiovascular:      Rate and Rhythm: Normal rate and regular rhythm.      Heart sounds: Normal heart sounds. No murmur heard.     Pulmonary:      Effort: Pulmonary effort is normal. No respiratory distress.      Breath sounds: Normal breath sounds. No stridor. No wheezing or rhonchi.   Musculoskeletal:      Cervical back: Normal range of motion and neck supple.   Skin:     General: Skin is warm and dry.      Coloration: Skin is not jaundiced.      Findings: Lesion (pilonidal cyst opening w/o redness or discharge) present.   Neurological:      General: No focal deficit present.      Mental Status: She is alert and oriented to person, place, and time.      Gait: Gait normal.   Psychiatric:         Mood and Affect: Mood normal.         Behavior: Behavior normal.          Result Review :                   Assessment and " Plan    Diagnoses and all orders for this visit:    1. Pilonidal cyst (Primary)  Assessment & Plan:  Refer to surgery for eval, not currently infected, will send in course of doxy to take if has sx of infection such as tenderness/pain/pus.     Orders:  -     Ambulatory Referral to General Surgery    2. Hidradenitis suppurativa  Assessment & Plan:  Cont to f/u with Derm as dir. Now followed by [facility] as [facility] stopped taking her ins.      Other orders  -     mupirocin (Bactroban) 2 % ointment; Apply  topically to the appropriate area as directed 3 (Three) Times a Day.  Dispense: 15 g; Refill: 0  -     doxycycline (VIBRAMYCIN) 100 MG capsule; Take 1 capsule by mouth 2 (Two) Times a Day.  Dispense: 20 capsule; Refill: 1        Follow Up   Return if symptoms worsen or fail to improve.      Follow up if symptoms worsen or persist or has new or concerning symptoms, go to ER for severe symptoms.   Reviewed common medication effects and side effects and advised to report side effects immediately, the patient expressed good understanding.  Encouraged medication compliance and the importance of keeping scheduled follow up appointments with me and any other providers  If labs or images are ordered we will contact you with the results within the next week.  If you have not heard from us after a week please call our office to inquire about the results.   Patient was given instructions and counseling regarding her condition or for health maintenance advice. Please see specific information pulled into the AVS if appropriate.     Promise Schmidt PA-C    * Please note that portions of this note may have been completed with a voice recognition program. Efforts were made to edit the dictation but occasionally words are erroneously transcribed.

## 2022-02-28 ENCOUNTER — OFFICE VISIT (OUTPATIENT)
Dept: INTERNAL MEDICINE | Facility: CLINIC | Age: 32
End: 2022-02-28

## 2022-02-28 VITALS
WEIGHT: 232.8 LBS | OXYGEN SATURATION: 99 % | TEMPERATURE: 96.9 F | SYSTOLIC BLOOD PRESSURE: 100 MMHG | DIASTOLIC BLOOD PRESSURE: 60 MMHG | BODY MASS INDEX: 36.54 KG/M2 | HEIGHT: 67 IN | HEART RATE: 83 BPM

## 2022-02-28 DIAGNOSIS — B00.9 HERPES SIMPLEX: ICD-10-CM

## 2022-02-28 DIAGNOSIS — L73.2 HIDRADENITIS SUPPURATIVA: Primary | ICD-10-CM

## 2022-02-28 PROBLEM — B00.1 FEVER BLISTER: Status: ACTIVE | Noted: 2022-02-28

## 2022-02-28 PROBLEM — B00.1 FEVER BLISTER: Status: RESOLVED | Noted: 2022-02-28 | Resolved: 2022-02-28

## 2022-02-28 PROCEDURE — 99214 OFFICE O/P EST MOD 30 MIN: CPT | Performed by: PHYSICIAN ASSISTANT

## 2022-02-28 RX ORDER — BENZOYL PEROXIDE 100 MG/ML
1 LIQUID TOPICAL DAILY
Qty: 227 G | Refills: 2 | Status: SHIPPED | OUTPATIENT
Start: 2022-02-28

## 2022-02-28 RX ORDER — VALACYCLOVIR HYDROCHLORIDE 1 G/1
TABLET, FILM COATED ORAL
Qty: 16 TABLET | Refills: 1 | Status: SHIPPED | OUTPATIENT
Start: 2022-02-28

## 2022-02-28 RX ORDER — NORGESTIMATE AND ETHINYL ESTRADIOL 0.25-0.035
1 KIT ORAL DAILY
COMMUNITY
Start: 2022-01-26

## 2022-02-28 NOTE — PROGRESS NOTES
Chief Complaint  herpes lesions    Subjective          History of Present Illness  Vahid Overton presents to [facility] for   Fever blister:  Gets them a few times a year and uses valtrex for them when she feels tingling. Just had an outbreak recently and it is healing up. Is now out of med.     Hidradenitis Suppurativa:  Is followed by derm and they started her on a body wash. She takes doxycycline prn for flare of boils, she notices dairy is a trigger. She needs refill on the body wash.       Review of Systems   Constitutional: Negative for fever and unexpected weight loss.   Respiratory: Negative for cough, shortness of breath and wheezing.    Cardiovascular: Negative for chest pain and palpitations.       The following portions of the patient's history were reviewed and updated as appropriate: allergies, current medications, past family history, past medical history, past social history, past surgical history and problem list.  Allergies   Allergen Reactions   • Sulfa Antibiotics Rash     Current Outpatient Medications on File Prior to Visit   Medication Sig Dispense Refill   • doxycycline (VIBRAMYCIN) 100 MG capsule Take 1 capsule by mouth 2 (Two) Times a Day. 20 capsule 1   • multivitamin with minerals tablet tablet Take 1 tablet by mouth Daily.     • norgestimate-ethinyl estradiol (ORTHO-CYCLEN) 0.25-35 MG-MCG per tablet Take 1 tablet by mouth Daily.     • [DISCONTINUED] benzoyl peroxide 10 % external wash      • Cholecalciferol (VITAMIN D3) 5000 units capsule capsule Take 5,000 Units by mouth Daily.     • clindamycin (CLEOCIN T) 1 % external solution      • levonorgestrel-ethinyl estradiol (SEASONALE) 0.15-0.03 MG per tablet TAKE 1 TABLET BY MOUTH EVERY DAY 91 tablet 3   • mupirocin (Bactroban) 2 % ointment Apply  topically to the appropriate area as directed 3 (Three) Times a Day. 15 g 0     No current facility-administered medications on file prior to visit.     New  "Medications Ordered This Visit   Medications   • valACYclovir (Valtrex) 1000 MG tablet     Simg PO q12 hr x 1 day, start ASAP after symptom onset     Dispense:  16 tablet     Refill:  1   • benzoyl peroxide 10 % external wash     Sig: Apply 1 application topically Daily.     Dispense:  227 g     Refill:  2     Social History     Tobacco Use   Smoking Status Never Smoker   Smokeless Tobacco Never Used        Objective   Vital Signs:   Vitals:    22 0823   BP: 100/60   Pulse: 83   Temp: 96.9 °F (36.1 °C)   TempSrc: Temporal   SpO2: 99%   Weight: 106 kg (232 lb 12.8 oz)   Height: 169.5 cm (66.73\")      Physical Exam  Vitals reviewed.   Constitutional:       General: She is not in acute distress.     Appearance: Normal appearance.   HENT:      Head: Normocephalic and atraumatic.   Eyes:      General: No scleral icterus.     Extraocular Movements: Extraocular movements intact.      Conjunctiva/sclera: Conjunctivae normal.   Cardiovascular:      Rate and Rhythm: Normal rate and regular rhythm.      Heart sounds: Normal heart sounds. No murmur heard.      Pulmonary:      Effort: Pulmonary effort is normal. No respiratory distress.      Breath sounds: Normal breath sounds. No stridor. No wheezing or rhonchi.   Musculoskeletal:      Cervical back: Normal range of motion and neck supple.   Skin:     General: Skin is warm and dry.      Coloration: Skin is not jaundiced.   Neurological:      General: No focal deficit present.      Mental Status: She is alert and oriented to person, place, and time.      Gait: Gait normal.   Psychiatric:         Mood and Affect: Mood normal.         Behavior: Behavior normal.        No LMP recorded. (Menstrual status: Other).    Result Review :                   Assessment and Plan    Diagnoses and all orders for this visit:    1. Hidradenitis suppurativa (Primary)  Assessment & Plan:  Chronic, stable. Cont to f/u with derm as dir, refill on BP body wash    Orders:  -     benzoyl " peroxide 10 % external wash; Apply 1 application topically Daily.  Dispense: 227 g; Refill: 2    2. Herpes simplex  Assessment & Plan:  Chronic, stable. Treat with valtrex, repeat as needed, f/u if has frequent flares.     Orders:  -     valACYclovir (Valtrex) 1000 MG tablet; 2000mg PO q12 hr x 1 day, start ASAP after symptom onset  Dispense: 16 tablet; Refill: 1      Follow Up   Return if symptoms worsen or fail to improve.    Follow up if symptoms worsen or persist or has new or concerning symptoms, go to ER for severe symptoms.   Reviewed common medication effects and side effects and advised to report side effects immediately.  Encouraged medication compliance and the importance of keeping scheduled follow up appointments with me and any other providers.  If a referral was made please contact our office if you have not heard about an appointment in the next 2 weeks.   If labs or images are ordered we will contact you with the results within the next week.  If you have not heard from us after a week please call our office to inquire about the results.   Patient was given instructions and counseling regarding her condition or for health maintenance advice. Please see specific information pulled into the AVS if appropriate.     Promise Schmidt PA-C    * Please note that portions of this note were completed with a voice recognition program.

## 2022-03-02 ENCOUNTER — OFFICE VISIT (OUTPATIENT)
Dept: INTERNAL MEDICINE | Facility: CLINIC | Age: 32
End: 2022-03-02

## 2022-03-02 VITALS
DIASTOLIC BLOOD PRESSURE: 66 MMHG | HEIGHT: 67 IN | RESPIRATION RATE: 18 BRPM | OXYGEN SATURATION: 96 % | BODY MASS INDEX: 36.98 KG/M2 | SYSTOLIC BLOOD PRESSURE: 114 MMHG | HEART RATE: 59 BPM | WEIGHT: 235.6 LBS | TEMPERATURE: 97.5 F

## 2022-03-02 DIAGNOSIS — Z76.89 ENCOUNTER TO ESTABLISH CARE: Primary | ICD-10-CM

## 2022-03-02 DIAGNOSIS — F41.8 DEPRESSION WITH ANXIETY: ICD-10-CM

## 2022-03-02 DIAGNOSIS — Z13.220 LIPID SCREENING: ICD-10-CM

## 2022-03-02 PROCEDURE — 99214 OFFICE O/P EST MOD 30 MIN: CPT | Performed by: INTERNAL MEDICINE

## 2022-03-02 NOTE — PROGRESS NOTES
"Chief Complaint  Establish Care    Subjective    Vahid Overton is a 31 y.o. female.     Vahid Overton presents to [facility] for       History of Present Illness    The following portions of the patient's history were reviewed and updated as appropriate: allergies, current medications, past family history, past medical history, past social history, past surgical history and problem list.    Anxiety-patient is here for establish care for concerns of worsening anxiety.  She currently has been seeking counseling and this has been helpful but her main priority is today for discussion of obtaining a dog at her current residence as a comfort and potential therapeutic dog.  Patient says that she thinks this will help benefit her in regards to helping her anxiety if she is able to have a dog that she can have in the household.  Patient denies any suicidal ideation, emotional liability threats towards her self or anybody else.        Review of Systems   Constitutional: Negative.    HENT: Negative.    Eyes: Negative.    Respiratory: Negative.    Endocrine: Negative.    Genitourinary: Negative.    Allergic/Immunologic: Negative.    Neurological: Negative.    Psychiatric/Behavioral: Positive for depressed mood and stress. Negative for sleep disturbance and suicidal ideas. The patient is nervous/anxious.        Objective   Vital Signs:   /66 (BP Location: Right arm, Patient Position: Sitting, Cuff Size: Adult)   Pulse 59   Temp 97.5 °F (36.4 °C) (Infrared)   Resp 18   Ht 170.2 cm (67\")   Wt 107 kg (235 lb 9.6 oz)   SpO2 96%   BMI 36.90 kg/m²     Body mass index is 36.9 kg/m².    Physical Exam  Vitals and nursing note reviewed.   Constitutional:       Appearance: Normal appearance. She is normal weight.   HENT:      Head: Normocephalic and atraumatic.      Nose: Nose normal.      Mouth/Throat:      Mouth: Mucous membranes are moist.   Eyes:      Pupils: " Pupils are equal, round, and reactive to light.   Cardiovascular:      Rate and Rhythm: Normal rate.      Pulses: Normal pulses.   Pulmonary:      Effort: Pulmonary effort is normal.   Musculoskeletal:         General: Normal range of motion.      Cervical back: Normal range of motion and neck supple.   Skin:     General: Skin is warm.   Neurological:      Mental Status: She is alert.   Psychiatric:         Mood and Affect: Mood normal.         Behavior: Behavior normal.         Thought Content: Thought content normal.         Judgment: Judgment normal.               Assessment and Plan  Diagnoses and all orders for this visit:    Diagnoses and all orders for this visit:    1. Encounter to establish care (Primary)  -     CBC (No Diff); Future  -     Comprehensive Metabolic Panel; Future  -     T4, Free; Future  -     TSH; Future    2. Depression with anxiety-continue on current medications    3. Lipid screening  -     Lipid Panel; Future    After review of history and physical, I am strongly recommending that patient be allowed a pet in her household as this will help with her comfort and coping emotionally in regards to her behavior health dealing with anxiety and depression.

## 2023-09-25 ENCOUNTER — HOSPITAL ENCOUNTER (EMERGENCY)
Facility: HOSPITAL | Age: 33
Discharge: HOME | End: 2023-09-25
Attending: STUDENT IN AN ORGANIZED HEALTH CARE EDUCATION/TRAINING PROGRAM | Admitting: STUDENT IN AN ORGANIZED HEALTH CARE EDUCATION/TRAINING PROGRAM
Payer: MEDICAID

## 2023-09-25 VITALS
SYSTOLIC BLOOD PRESSURE: 138 MMHG | TEMPERATURE: 98.3 F | BODY MASS INDEX: 36.1 KG/M2 | DIASTOLIC BLOOD PRESSURE: 57 MMHG | HEIGHT: 67 IN | HEART RATE: 76 BPM | RESPIRATION RATE: 16 BRPM | OXYGEN SATURATION: 98 % | WEIGHT: 230 LBS

## 2023-09-25 DIAGNOSIS — B34.9 VIRAL ILLNESS: ICD-10-CM

## 2023-09-25 DIAGNOSIS — G43.809 OTHER MIGRAINE WITHOUT STATUS MIGRAINOSUS, NOT INTRACTABLE: Primary | ICD-10-CM

## 2023-09-25 LAB
FLUAV RNA RESP QL NAA+PROBE: NOT DETECTED
FLUBV RNA RESP QL NAA+PROBE: NOT DETECTED
SARS-COV-2 RNA RESP QL NAA+PROBE: NOT DETECTED

## 2023-09-25 PROCEDURE — 99283 EMERGENCY DEPT VISIT LOW MDM: CPT

## 2023-09-25 PROCEDURE — 25010000002 DIPHENHYDRAMINE PER 50 MG: Performed by: PHYSICIAN ASSISTANT

## 2023-09-25 PROCEDURE — 25010000002 KETOROLAC TROMETHAMINE PER 15 MG: Performed by: PHYSICIAN ASSISTANT

## 2023-09-25 PROCEDURE — 96365 THER/PROPH/DIAG IV INF INIT: CPT

## 2023-09-25 PROCEDURE — 96366 THER/PROPH/DIAG IV INF ADDON: CPT

## 2023-09-25 PROCEDURE — 25010000002 ONDANSETRON PER 1 MG: Performed by: PHYSICIAN ASSISTANT

## 2023-09-25 PROCEDURE — 96375 TX/PRO/DX INJ NEW DRUG ADDON: CPT

## 2023-09-25 PROCEDURE — 87636 SARSCOV2 & INF A&B AMP PRB: CPT | Performed by: PHYSICIAN ASSISTANT

## 2023-09-25 PROCEDURE — 25010000002 DEXAMETHASONE SODIUM PHOSPHATE 100 MG/10ML SOLUTION: Performed by: PHYSICIAN ASSISTANT

## 2023-09-25 RX ORDER — KETOROLAC TROMETHAMINE 30 MG/ML
30 INJECTION, SOLUTION INTRAMUSCULAR; INTRAVENOUS ONCE
Status: COMPLETED | OUTPATIENT
Start: 2023-09-25 | End: 2023-09-25

## 2023-09-25 RX ORDER — DIPHENHYDRAMINE HYDROCHLORIDE 50 MG/ML
25 INJECTION INTRAMUSCULAR; INTRAVENOUS ONCE
Status: COMPLETED | OUTPATIENT
Start: 2023-09-25 | End: 2023-09-25

## 2023-09-25 RX ORDER — SODIUM CHLORIDE 0.9 % (FLUSH) 0.9 %
10 SYRINGE (ML) INJECTION AS NEEDED
Status: DISCONTINUED | OUTPATIENT
Start: 2023-09-25 | End: 2023-09-25 | Stop reason: HOSPADM

## 2023-09-25 RX ORDER — KETOROLAC TROMETHAMINE 30 MG/ML
60 INJECTION, SOLUTION INTRAMUSCULAR; INTRAVENOUS ONCE
Status: DISCONTINUED | OUTPATIENT
Start: 2023-09-25 | End: 2023-09-25

## 2023-09-25 RX ORDER — ONDANSETRON 2 MG/ML
4 INJECTION INTRAMUSCULAR; INTRAVENOUS ONCE
Status: COMPLETED | OUTPATIENT
Start: 2023-09-25 | End: 2023-09-25

## 2023-09-25 RX ADMIN — KETOROLAC TROMETHAMINE 30 MG: 30 INJECTION, SOLUTION INTRAMUSCULAR; INTRAVENOUS at 14:27

## 2023-09-25 RX ADMIN — DEXAMETHASONE SODIUM PHOSPHATE 10 MG: 10 INJECTION, SOLUTION INTRAMUSCULAR; INTRAVENOUS at 14:26

## 2023-09-25 RX ADMIN — SODIUM CHLORIDE 1000 ML: 9 INJECTION, SOLUTION INTRAVENOUS at 14:27

## 2023-09-25 RX ADMIN — ONDANSETRON 4 MG: 2 INJECTION INTRAMUSCULAR; INTRAVENOUS at 14:27

## 2023-09-25 RX ADMIN — DIPHENHYDRAMINE HYDROCHLORIDE 25 MG: 50 INJECTION INTRAMUSCULAR; INTRAVENOUS at 14:27

## 2023-09-25 NOTE — ED PROVIDER NOTES
Subjective   History of Present Illness  Pt is a 34 yo female presenting to ED with complaints headache and congestion. PMHx significant for Herpes, Hidradenitis suppurativa. Pt reports having an intermittent headache for the past 6 days. The headache improves with Motrin but returns feels like prior migraines. She also has had nasal congestion, body aches and nausea. She denies fever. She also recently started pilates and is not sure if her muscles are sore from that. She has light sensitivity but denies dizziness, numbness or weakness. She denies neck pain or stiffness. She denies CP, SOB, cough, V/D or abdominal pain. She uses marijuana and ETOH but but denies tobacco use.     History provided by:  Patient and medical records    Review of Systems   Constitutional:  Negative for fever.   HENT:  Positive for congestion. Negative for ear pain, facial swelling, sore throat and trouble swallowing.    Eyes:  Positive for photophobia. Negative for pain and visual disturbance.   Respiratory:  Negative for cough and shortness of breath.    Gastrointestinal:  Positive for nausea. Negative for abdominal pain, diarrhea and vomiting.   Genitourinary:  Negative for difficulty urinating.   Musculoskeletal:  Negative for back pain and neck pain.   Neurological:  Positive for headaches. Negative for dizziness, syncope, speech difficulty, weakness and numbness.   Psychiatric/Behavioral:  Negative for confusion.      Past Medical History:   Diagnosis Date    History of Papanicolaou smear of cervix 11/12/2018, 02/2017 02/2017-Student clinic at . [facility]       Allergies   Allergen Reactions    Sulfa Antibiotics Rash       No past surgical history on file.    Family History   Problem Relation Age of Onset    Breast cancer Mother 36        1994    Diabetes Father     Breast cancer Maternal Aunt     Diabetes Paternal Uncle     Breast cancer Maternal Grandmother     Diabetes Paternal Grandmother     Kidney disease Paternal  Grandmother     No Known Problems Paternal Grandfather     Diabetes Paternal Uncle     Diabetes Paternal Uncle     No Known Problems Sister     No Known Problems Brother     No Known Problems Sister     No Known Problems Brother     No Known Problems Brother     No Known Problems Brother        Social History     Socioeconomic History    Marital status: Single   Tobacco Use    Smoking status: Never    Smokeless tobacco: Never   Substance and Sexual Activity    Alcohol use: Yes     Comment: Socially on occasion    Drug use: Yes     Types: Marijuana    Sexual activity: Yes     Partners: Male     Birth control/protection: Inserts           Objective   Physical Exam  Vitals and nursing note reviewed.   Constitutional:       Appearance: She is well-developed.   HENT:      Head: Atraumatic.      Nose: Nose normal.   Eyes:      General: Lids are normal.      Conjunctiva/sclera: Conjunctivae normal.      Pupils: Pupils are equal, round, and reactive to light.   Cardiovascular:      Rate and Rhythm: Normal rate and regular rhythm.      Heart sounds: Normal heart sounds.   Pulmonary:      Effort: Pulmonary effort is normal.      Breath sounds: Normal breath sounds. No wheezing.   Abdominal:      General: There is no distension.      Palpations: Abdomen is soft.      Tenderness: There is no abdominal tenderness. There is no guarding or rebound.   Musculoskeletal:         General: No tenderness. Normal range of motion.      Cervical back: Normal range of motion and neck supple.   Skin:     General: Skin is warm and dry.      Findings: No erythema or rash.   Neurological:      Mental Status: She is alert and oriented to person, place, and time.      Cranial Nerves: Cranial nerves 2-12 are intact.      Sensory: Sensation is intact. No sensory deficit.      Motor: Motor function is intact.   Psychiatric:         Speech: Speech normal.         Behavior: Behavior normal.       Procedures           ED Course      Recent Results (from  "the past 24 hour(s))   COVID-19 and FLU A/B PCR - Swab, Nasopharynx    Collection Time: 09/25/23  2:32 PM    Specimen: Nasopharynx; Swab   Result Value Ref Range    COVID19 Not Detected Not Detected - Ref. Range    Influenza A PCR Not Detected Not Detected    Influenza B PCR Not Detected Not Detected     Note: In addition to lab results from this visit, the labs listed above may include labs taken at another facility or during a different encounter within the last 24 hours. Please correlate lab times with ED admission and discharge times for further clarification of the services performed during this visit.    No orders to display     Vitals:    09/25/23 1306 09/25/23 1607   BP: 138/57 138/57   BP Location: Left arm    Patient Position: Sitting    Pulse: 76 76   Resp: 16 16   Temp: 98.3 °F (36.8 °C)    TempSrc: Oral    SpO2: 97% 98%   Weight: 104 kg (230 lb)    Height: 170.2 cm (67\")      Medications   ketorolac (TORADOL) injection 30 mg (30 mg Intravenous Given 9/25/23 1427)   dexAMETHasone (DECADRON) IVPB 10 mg (0 mg Intravenous Stopped 9/25/23 1606)   ondansetron (ZOFRAN) injection 4 mg (4 mg Intravenous Given 9/25/23 1427)   diphenhydrAMINE (BENADRYL) injection 25 mg (25 mg Intravenous Given 9/25/23 1427)   sodium chloride 0.9 % bolus 1,000 mL (0 mL Intravenous Stopped 9/25/23 1606)     ECG/EMG Results (last 24 hours)       ** No results found for the last 24 hours. **          No orders to display       DISCHARGE    Patient discharged in stable condition.    Reviewed implications of results, diagnosis, meds, responsibility to follow up, warning signs and symptoms of possible worsening, potential complications and reasons to return to ER.    Patient/Family voiced understanding of above instructions.    Discussed plan for discharge, as there is no emergent indication for admission.  Pt/family is agreeable and understands need for follow up and possible repeat testing.  Pt/family is aware that discharge does not " mean that nothing is wrong but that it indicates no emergency is currently present that requires admission and they must continue care with follow-up as given below or with a physician of their choice.     FOLLOW-UP  Elie Purvis MD  [address]  [place] [id number] [phone]    Schedule an appointment as soon as possible for a visit       [facility] EMERGENCY DEPARTMENT  [address] [address] [phone]    If symptoms worsen         Medication List      No changes were made to your prescriptions during this visit.                                            Medical Decision Making  Pt is a 34 yo female presenting to ED with complaints of headache for 6 days that feels similar to prior migraines. She also has congestion and body aches. Covid/Flu negative. Patient given migraine cocktail and symptoms resolved. She is eager to go home. Went over new / worse sx to return to ED.     DDx  Migraine, Covid, Flu, Meningitis, Viral syndrome, Dehydration, CVA     Problems Addressed:  Other migraine without status migrainosus, not intractable: complicated acute illness or injury  Viral illness: complicated acute illness or injury    Amount and/or Complexity of Data Reviewed  External Data Reviewed: notes.     Details: PCP  Labs: ordered. Decision-making details documented in ED Course.    Risk  Prescription drug management.        Final diagnoses:   Other migraine without status migrainosus, not intractable   Viral illness       ED Disposition  ED Disposition       ED Disposition   Discharge    Condition   Stable    Comment   --               Elie Purvis MD  [address]  [place] [id number] [phone]    Schedule an appointment as soon as possible for a visit       [facility] EMERGENCY DEPARTMENT  [address] [address] [phone]    If symptoms worsen         Medication List      No  changes were made to your prescriptions during this visit.            Paulina Lynch PA  09/25/23 1748

## 2023-09-28 ENCOUNTER — HOSPITAL ENCOUNTER (OUTPATIENT)
Dept: CT IMAGING | Facility: HOSPITAL | Age: 33
Discharge: HOME | End: 2023-09-28
Admitting: INTERNAL MEDICINE
Payer: MEDICAID

## 2023-09-28 ENCOUNTER — OFFICE VISIT (OUTPATIENT)
Dept: INTERNAL MEDICINE | Facility: CLINIC | Age: 33
End: 2023-09-28
Payer: MEDICAID

## 2023-09-28 VITALS
TEMPERATURE: 97.5 F | RESPIRATION RATE: 16 BRPM | BODY MASS INDEX: 36.57 KG/M2 | HEART RATE: 60 BPM | WEIGHT: 233.5 LBS | DIASTOLIC BLOOD PRESSURE: 58 MMHG | SYSTOLIC BLOOD PRESSURE: 112 MMHG

## 2023-09-28 DIAGNOSIS — R51.9 SEVERE HEADACHE: ICD-10-CM

## 2023-09-28 DIAGNOSIS — M79.10 MUSCLE PAIN: ICD-10-CM

## 2023-09-28 DIAGNOSIS — B00.9 HERPES SIMPLEX: ICD-10-CM

## 2023-09-28 DIAGNOSIS — J01.10 ACUTE NON-RECURRENT FRONTAL SINUSITIS: ICD-10-CM

## 2023-09-28 DIAGNOSIS — R51.9 SEVERE HEADACHE: Primary | ICD-10-CM

## 2023-09-28 LAB
ALBUMIN SERPL-MCNC: 3.7 G/DL (ref 3.5–5.2)
ALBUMIN/GLOB SERPL: 1.1 G/DL
ALP SERPL-CCNC: 46 U/L (ref 39–117)
ALT SERPL W P-5'-P-CCNC: 16 U/L (ref 1–33)
ANION GAP SERPL CALCULATED.3IONS-SCNC: 9 MMOL/L (ref 5–15)
AST SERPL-CCNC: 19 U/L (ref 1–32)
BILIRUB BLD-MCNC: NEGATIVE MG/DL
BILIRUB SERPL-MCNC: 0.4 MG/DL (ref 0–1.2)
BUN SERPL-MCNC: 15 MG/DL (ref 6–20)
BUN/CREAT SERPL: 15.5 (ref 7–25)
CALCIUM SPEC-SCNC: 9 MG/DL (ref 8.6–10.5)
CHLORIDE SERPL-SCNC: 103 MMOL/L (ref 98–107)
CHOLEST SERPL-MCNC: 115 MG/DL (ref 0–200)
CK SERPL-CCNC: 117 U/L (ref 20–180)
CLARITY, POC: CLEAR
CO2 SERPL-SCNC: 27 MMOL/L (ref 22–29)
COLOR UR: YELLOW
CREAT SERPL-MCNC: 0.97 MG/DL (ref 0.57–1)
DEPRECATED RDW RBC AUTO: 40.7 FL (ref 37–54)
EGFRCR SERPLBLD CKD-EPI 2021: 80.3 ML/MIN/1.73
ERYTHROCYTE [DISTWIDTH] IN BLOOD BY AUTOMATED COUNT: 11.6 % (ref 12.3–15.4)
EXPIRATION DATE: NORMAL
GLOBULIN UR ELPH-MCNC: 3.3 GM/DL
GLUCOSE SERPL-MCNC: 84 MG/DL (ref 65–99)
GLUCOSE UR STRIP-MCNC: NEGATIVE MG/DL
HCT VFR BLD AUTO: 36 % (ref 34–46.6)
HDLC SERPL-MCNC: 40 MG/DL (ref 40–60)
HGB BLD-MCNC: 12.5 G/DL (ref 12–15.9)
KETONES UR QL: NEGATIVE
LDLC SERPL CALC-MCNC: 59 MG/DL (ref 0–100)
LDLC/HDLC SERPL: 1.49 {RATIO}
LEUKOCYTE EST, POC: NEGATIVE
Lab: NORMAL
MCH RBC QN AUTO: 33.2 PG (ref 26.6–33)
MCHC RBC AUTO-ENTMCNC: 34.7 G/DL (ref 31.5–35.7)
MCV RBC AUTO: 95.7 FL (ref 79–97)
NITRITE UR-MCNC: NEGATIVE MG/ML
PH UR: 5 [PH] (ref 5–8)
PLATELET # BLD AUTO: 377 10*3/MM3 (ref 140–450)
PMV BLD AUTO: 9.3 FL (ref 6–12)
POTASSIUM SERPL-SCNC: 4.4 MMOL/L (ref 3.5–5.2)
PROT SERPL-MCNC: 7 G/DL (ref 6–8.5)
PROT UR STRIP-MCNC: NEGATIVE MG/DL
RBC # BLD AUTO: 3.76 10*6/MM3 (ref 3.77–5.28)
RBC # UR STRIP: NEGATIVE /UL
SODIUM SERPL-SCNC: 139 MMOL/L (ref 136–145)
SP GR UR: 1.02 (ref 1–1.03)
T4 FREE SERPL-MCNC: 1.02 NG/DL (ref 0.93–1.7)
TRIGL SERPL-MCNC: 78 MG/DL (ref 0–150)
TSH SERPL DL<=0.05 MIU/L-ACNC: 2.47 UIU/ML (ref 0.27–4.2)
UROBILINOGEN UR QL: NORMAL
VLDLC SERPL-MCNC: 16 MG/DL (ref 5–40)
WBC NRBC COR # BLD: 10.46 10*3/MM3 (ref 3.4–10.8)

## 2023-09-28 PROCEDURE — 80050 GENERAL HEALTH PANEL: CPT | Performed by: INTERNAL MEDICINE

## 2023-09-28 PROCEDURE — 82550 ASSAY OF CK (CPK): CPT | Performed by: INTERNAL MEDICINE

## 2023-09-28 PROCEDURE — 70450 CT HEAD/BRAIN W/O DYE: CPT

## 2023-09-28 PROCEDURE — 80061 LIPID PANEL: CPT | Performed by: INTERNAL MEDICINE

## 2023-09-28 PROCEDURE — 84439 ASSAY OF FREE THYROXINE: CPT | Performed by: INTERNAL MEDICINE

## 2023-09-28 RX ORDER — IBUPROFEN 800 MG/1
800 TABLET ORAL EVERY 6 HOURS PRN
Qty: 60 TABLET | Refills: 2 | Status: SHIPPED | OUTPATIENT
Start: 2023-09-28

## 2023-09-28 RX ORDER — VALACYCLOVIR HYDROCHLORIDE 1 G/1
TABLET, FILM COATED ORAL
Qty: 16 TABLET | Refills: 1 | Status: SHIPPED | OUTPATIENT
Start: 2023-09-28

## 2023-09-28 RX ORDER — KETOROLAC TROMETHAMINE 30 MG/ML
60 INJECTION, SOLUTION INTRAMUSCULAR; INTRAVENOUS ONCE
Status: COMPLETED | OUTPATIENT
Start: 2023-09-28 | End: 2023-09-28

## 2023-09-28 RX ORDER — FLUTICASONE PROPIONATE 50 MCG
2 SPRAY, SUSPENSION (ML) NASAL DAILY
Qty: 15.8 ML | Refills: 2 | Status: SHIPPED | OUTPATIENT
Start: 2023-09-28

## 2023-09-28 RX ORDER — AMOXICILLIN AND CLAVULANATE POTASSIUM 875; 125 MG/1; MG/1
1 TABLET, FILM COATED ORAL 2 TIMES DAILY
Qty: 20 TABLET | Refills: 0 | Status: SHIPPED | OUTPATIENT
Start: 2023-09-28

## 2023-09-28 RX ADMIN — KETOROLAC TROMETHAMINE 60 MG: 30 INJECTION, SOLUTION INTRAMUSCULAR; INTRAVENOUS at 12:13

## 2023-09-28 NOTE — PROGRESS NOTES
Chief Complaint  Headache    Subjective        Vahid Overton presents to [facility] for headache. She is accompanied by her boyfriend who is also in the room as well.  History of Present Illness    1. Headache. - The patient has been having a headache for the past 1 week. She describes the headache as throbbing in her eye area, sinuses, and teeth. The headache wakes her up out of sleep often. Ibuprofen helps, but once it wears off, the headache is intense and intensified. She also has body aches. She started hot yoga and hot Pilates 3 weeks ago and attributes the body aches to that. The pain is debilitating and she feels the creeping and can not walk. She went to the emergency room 2 days ago at [facility]. She was swabbed for COVID-19 and influenza. She was given a migraine cocktail. They did not do any blood work or test. She was given Decadron, Toradol, Benadryl, and Zofran. She felt refreshed. She got up off the table and felt good the next day. She worked out normally. She is not sure if it started wearing out and then it started coming back. She has tried Sudafed, Benadryl, vitamin C, zinc, and B flex complex. She got a massage and that will help with the body aches. Her arm was hurting a lot and it was a dull aching pain, but that ended up resolving 4 to 5 days later, but this headache is not letting up. She thought after the steroids, it would, but it calmed it down, but once it all wore off, the headache returned. She rates her pain as an 8 or 9 out of 10. Prior to 1 week, she did not have the headache, but she was having body aches. She does some type of yoga and it is 26 and 2 and she does different standing poses. It is therapeutic and it is helping her stretching. When she is leaving, she is in so much pain. She has been doing that for 1 year now. She gets nauseous, but not where she wants to throw up. She can feel it in her stomach. She is not dizzy  "unless she gets up too fast. She denies any numbness or tingling in her arms or hands. She denies any visual problems. If she lays on one side, she gets numb, but it is nothing. The headaches wake her up out of sleep. The headaches have been the same intensity over the past week. If she does not take anything, the headache is the same intensity. She bought a bottle of ibuprofen 3 days ago and she is out of that now. She takes 4 each time. She denies any family history of migraines.    Objective   Vital Signs:  /58 (BP Location: Right arm, Patient Position: Sitting, Cuff Size: Adult)   Pulse 60   Temp 97.5 °F (36.4 °C) (Temporal)   Resp 16   Wt 106 kg (233 lb 8 oz)   BMI 36.57 kg/m²   Estimated body mass index is 36.57 kg/m² as calculated from the following:    Height as of 9/25/23: 170.2 cm (67\").    Weight as of this encounter: 106 kg (233 lb 8 oz).       Class 2 Severe Obesity (BMI >=35 and <=39.9). Obesity-related health conditions include the following: none. Obesity is improving with lifestyle modifications. BMI is is above average; BMI management plan is completed. We discussed portion control and increasing exercise.      Physical Exam     Patient is alert x3, non-distressed.  HEENT, head is normocephalic, atraumatic. Pupils equal to light and accommodation. Extraocular muscles intact. Moist membranes. Neck was supple. No cervical lymphadenopathy. No goiter.  Chest is clear to auscultation. No rhonchi or wheeze.  Cardiac exam, S1, S2. No murmurs, rubs, or gallops.  Peripheral vascular, +2 pulses, warm extremity, good perfusion.  Musculoskeletal exam, plus 5 out of 5 both upper and lower proximal distribution.  Neurologically, cranial nerves intact, +2 DTRs. Upon supine position, patient did not have any signs of meningeal signs.           Assessment and Plan   Diagnoses and all orders for this visit:    1. Severe headache (Primary)  -     CBC (No Diff); Future  -     Comprehensive Metabolic Panel; " Future  -     T4, Free; Future  -     TSH; Future  -     Lipid Panel; Future  -     CT Head Without Contrast; Future  -     CBC (No Diff)  -     Comprehensive Metabolic Panel  -     T4, Free  -     TSH  -     Lipid Panel  -     ketorolac (TORADOL) injection 60 mg  -     POC Urinalysis Dipstick, Automated    2. Herpes simplex  -     valACYclovir (Valtrex) 1000 MG tablet; 2000mg PO q12 hr x 1 day, start ASAP after symptom onset  Dispense: 16 tablet; Refill: 1    3. Muscle pain  -     Cancel: CK; Future  -     CK; Future  -     CK  -     ketorolac (TORADOL) injection 60 mg  -     POC Urinalysis Dipstick, Automated    1. New onset severe headache with red flag symptoms of headache worsening on along the weekend, also waking patient up in the middle of the night.  - After review of history and physical, differential diagnosis includes migraine headaches plus new acute severe onset headache. With that being said, after review of history and physical, I am strongly recommending that patient have a CT scan without contrast to evaluate for any intracranial involvement or any other concerns of contributing to the headache and so we have done that as a stat read.  - Labs, I will also do CBC, CMP, TSH, free T4, lipid profile and also a CK level for concerns of possible rhabdomyolysis as patient has been physically active in the gym and also exhibiting muscle soreness, which is also considered in the differential.  - I will continue an addendum based on CT scan, which is a stat read, which will happen results in about 1 hour.        Return if symptoms worsen or fail to improve.  Patient was given instructions and counseling regarding her condition or for health maintenance advice. Please see specific information pulled into the AVS if appropriate.     Transcribed from ambient dictation for Elie Purvis MD by Phoebe Christensen.  09/28/23   12:50 EDT    Patient or patient representative verbalized consent to the visit recording.  I have  personally performed the services described in this document as transcribed by the above individual, and it is both accurate and complete.

## 2023-10-02 ENCOUNTER — TELEPHONE (OUTPATIENT)
Dept: INTERNAL MEDICINE | Facility: CLINIC | Age: 33
End: 2023-10-02
Payer: MEDICAID

## 2023-10-02 NOTE — TELEPHONE ENCOUNTER
Caller: Vahid Overton    Relationship: Self    Best call back number: [phone]     What form or medical record are you requesting: FMLA    Who is requesting this form or medical record from you: VLADIMIR     How would you like to receive the form or medical records (pick-up, mail, fax): FAX  If fax, what is the fax number: ON FORM    Timeframe paperwork needed: THIS IS DUE BY 10.13.23    Additional notes: VLADIMIR DID NOT RECEIVE IT AND SHE IS WANTING TO KNOW IF THIS WAS RECEIVED. THEY TOLD HER THEY FAXED IT TO THE NUMBER THEY HAVE ON FILE. PLEASE LET HER KNOW ASAP.

## 2023-10-02 NOTE — TELEPHONE ENCOUNTER
Reached Pt and advised paper work is not in PCP inbox and asked if she can reach out to [employer] to have them refax to office. Pt verbalized good understanding stating she will.

## 2023-10-03 NOTE — TELEPHONE ENCOUNTER
PATIENT CALLED BACK TODAY TO CHECK TO SEE IF WE RECEIVED THE FMLA DOCUMENTS AS [employer] WAS SUPPOSED TO REFAX THEM    PLEASE CALL PATIENT ONCE DOCUMENTS HAVE BEEN RECEIVED AS SHE WILL BRING THEM IN IF NEED BE. [phone]

## 2023-11-24 DIAGNOSIS — J01.10 ACUTE NON-RECURRENT FRONTAL SINUSITIS: ICD-10-CM

## 2023-11-27 RX ORDER — AMOXICILLIN AND CLAVULANATE POTASSIUM 875; 125 MG/1; MG/1
1 TABLET, FILM COATED ORAL 2 TIMES DAILY
Qty: 20 TABLET | Refills: 0 | OUTPATIENT
Start: 2023-11-27

## 2024-03-19 DIAGNOSIS — J01.10 ACUTE NON-RECURRENT FRONTAL SINUSITIS: ICD-10-CM

## 2024-03-19 RX ORDER — IBUPROFEN 800 MG/1
800 TABLET ORAL EVERY 6 HOURS PRN
Qty: 60 TABLET | Refills: 2 | Status: SHIPPED | OUTPATIENT
Start: 2024-03-19

## 2024-03-19 RX ORDER — AMOXICILLIN AND CLAVULANATE POTASSIUM 500; 125 MG/1; MG/1
1 TABLET, FILM COATED ORAL 2 TIMES DAILY
Qty: 20 TABLET | Refills: 0 | Status: SHIPPED | OUTPATIENT
Start: 2024-03-19

## 2024-08-12 DIAGNOSIS — L73.2 HIDRADENITIS SUPPURATIVA: Primary | ICD-10-CM

## 2024-08-12 RX ORDER — CLINDAMYCIN HYDROCHLORIDE 300 MG/1
300 CAPSULE ORAL 3 TIMES DAILY
Qty: 30 CAPSULE | Refills: 0 | Status: SHIPPED | OUTPATIENT
Start: 2024-08-12

## 2024-08-12 RX ORDER — DOXYCYCLINE HYCLATE 100 MG/1
100 CAPSULE ORAL 2 TIMES DAILY
Qty: 20 CAPSULE | Refills: 0 | Status: SHIPPED | OUTPATIENT
Start: 2024-08-12

## 2024-08-30 DIAGNOSIS — L73.2 HIDRADENITIS SUPPURATIVA: Primary | ICD-10-CM

## 2024-08-30 RX ORDER — CLINDAMYCIN PHOSPHATE 11.9 MG/ML
SOLUTION TOPICAL 2 TIMES DAILY
Qty: 60 ML | Refills: 3 | Status: SHIPPED | OUTPATIENT
Start: 2024-08-30

## 2024-08-30 RX ORDER — DOXYCYCLINE 100 MG/1
100 CAPSULE ORAL 2 TIMES DAILY
Qty: 20 CAPSULE | Refills: 1 | Status: SHIPPED | OUTPATIENT
Start: 2024-08-30

## 2024-09-21 DIAGNOSIS — B00.9 HERPES SIMPLEX: Primary | ICD-10-CM

## 2024-09-21 RX ORDER — ACYCLOVIR 400 MG/1
400 TABLET ORAL 2 TIMES DAILY
Qty: 10 TABLET | Refills: 4 | Status: SHIPPED | OUTPATIENT
Start: 2024-09-21